# Patient Record
Sex: FEMALE | Race: WHITE | NOT HISPANIC OR LATINO | Employment: FULL TIME | URBAN - METROPOLITAN AREA
[De-identification: names, ages, dates, MRNs, and addresses within clinical notes are randomized per-mention and may not be internally consistent; named-entity substitution may affect disease eponyms.]

---

## 2017-02-27 ENCOUNTER — RECORDS - HEALTHEAST (OUTPATIENT)
Dept: LAB | Facility: CLINIC | Age: 52
End: 2017-02-27

## 2017-02-27 LAB
CHOLEST SERPL-MCNC: 149 MG/DL
FASTING STATUS PATIENT QL REPORTED: NORMAL
HDLC SERPL-MCNC: 52 MG/DL
LDLC SERPL CALC-MCNC: 86 MG/DL
TRIGL SERPL-MCNC: 57 MG/DL

## 2018-03-09 ENCOUNTER — RECORDS - HEALTHEAST (OUTPATIENT)
Dept: LAB | Facility: CLINIC | Age: 53
End: 2018-03-09

## 2018-03-09 LAB
ALBUMIN SERPL-MCNC: 3.8 G/DL (ref 3.5–5)
ALP SERPL-CCNC: 95 U/L (ref 45–120)
ALT SERPL W P-5'-P-CCNC: 73 U/L (ref 0–45)
ANION GAP SERPL CALCULATED.3IONS-SCNC: 7 MMOL/L (ref 5–18)
AST SERPL W P-5'-P-CCNC: 37 U/L (ref 0–40)
BILIRUB SERPL-MCNC: 0.2 MG/DL (ref 0–1)
BUN SERPL-MCNC: 14 MG/DL (ref 8–22)
CALCIUM SERPL-MCNC: 9.4 MG/DL (ref 8.5–10.5)
CHLORIDE BLD-SCNC: 107 MMOL/L (ref 98–107)
CHOLEST SERPL-MCNC: 176 MG/DL
CO2 SERPL-SCNC: 24 MMOL/L (ref 22–31)
CREAT SERPL-MCNC: 0.72 MG/DL (ref 0.6–1.1)
FASTING STATUS PATIENT QL REPORTED: NORMAL
GFR SERPL CREATININE-BSD FRML MDRD: >60 ML/MIN/1.73M2
GLUCOSE BLD-MCNC: 81 MG/DL (ref 70–125)
HDLC SERPL-MCNC: 56 MG/DL
LDLC SERPL CALC-MCNC: 109 MG/DL
POTASSIUM BLD-SCNC: 4 MMOL/L (ref 3.5–5)
PROT SERPL-MCNC: 6.4 G/DL (ref 6–8)
SODIUM SERPL-SCNC: 138 MMOL/L (ref 136–145)
TRIGL SERPL-MCNC: 53 MG/DL

## 2018-12-21 ENCOUNTER — RECORDS - HEALTHEAST (OUTPATIENT)
Dept: ADMINISTRATIVE | Facility: OTHER | Age: 53
End: 2018-12-21

## 2018-12-31 ENCOUNTER — HOSPITAL ENCOUNTER (OUTPATIENT)
Dept: ULTRASOUND IMAGING | Facility: CLINIC | Age: 53
Discharge: HOME | End: 2018-12-31
Attending: PSYCHIATRY & NEUROLOGY

## 2018-12-31 ENCOUNTER — HOSPITAL ENCOUNTER (OUTPATIENT)
Dept: MRI IMAGING | Facility: CLINIC | Age: 53
Discharge: HOME | End: 2018-12-31
Attending: PSYCHIATRY & NEUROLOGY

## 2018-12-31 ENCOUNTER — COMMUNICATION - HEALTHEAST (OUTPATIENT)
Dept: TELEHEALTH | Facility: CLINIC | Age: 53
End: 2018-12-31

## 2018-12-31 DIAGNOSIS — G45.4 TRANSIENT GLOBAL AMNESIA: ICD-10-CM

## 2019-02-21 ENCOUNTER — RECORDS - HEALTHEAST (OUTPATIENT)
Dept: ADMINISTRATIVE | Facility: OTHER | Age: 54
End: 2019-02-21

## 2019-08-06 ENCOUNTER — RECORDS - HEALTHEAST (OUTPATIENT)
Dept: LAB | Facility: CLINIC | Age: 54
End: 2019-08-06

## 2019-08-07 LAB
ALBUMIN SERPL-MCNC: 4.3 G/DL (ref 3.5–5)
ALP SERPL-CCNC: 103 U/L (ref 45–120)
ALT SERPL W P-5'-P-CCNC: 14 U/L (ref 0–45)
ANION GAP SERPL CALCULATED.3IONS-SCNC: 8 MMOL/L (ref 5–18)
AST SERPL W P-5'-P-CCNC: 13 U/L (ref 0–40)
BILIRUB SERPL-MCNC: 0.2 MG/DL (ref 0–1)
BUN SERPL-MCNC: 15 MG/DL (ref 8–22)
CALCIUM SERPL-MCNC: 9.8 MG/DL (ref 8.5–10.5)
CHLORIDE BLD-SCNC: 109 MMOL/L (ref 98–107)
CO2 SERPL-SCNC: 23 MMOL/L (ref 22–31)
CREAT SERPL-MCNC: 0.76 MG/DL (ref 0.6–1.1)
GFR SERPL CREATININE-BSD FRML MDRD: >60 ML/MIN/1.73M2
GLUCOSE BLD-MCNC: 86 MG/DL (ref 70–125)
LIPASE SERPL-CCNC: 72 U/L (ref 0–52)
POTASSIUM BLD-SCNC: 4 MMOL/L (ref 3.5–5)
PROT SERPL-MCNC: 6.8 G/DL (ref 6–8)
SODIUM SERPL-SCNC: 140 MMOL/L (ref 136–145)

## 2019-08-29 ENCOUNTER — RECORDS - HEALTHEAST (OUTPATIENT)
Dept: LAB | Facility: CLINIC | Age: 54
End: 2019-08-29

## 2019-08-29 LAB — LIPASE SERPL-CCNC: 10 U/L (ref 0–52)

## 2020-02-27 ENCOUNTER — RECORDS - HEALTHEAST (OUTPATIENT)
Dept: LAB | Facility: CLINIC | Age: 55
End: 2020-02-27

## 2020-02-27 LAB
ALBUMIN SERPL-MCNC: 4 G/DL (ref 3.5–5)
ALP SERPL-CCNC: 110 U/L (ref 45–120)
ALT SERPL W P-5'-P-CCNC: 16 U/L (ref 0–45)
ANION GAP SERPL CALCULATED.3IONS-SCNC: 11 MMOL/L (ref 5–18)
AST SERPL W P-5'-P-CCNC: 16 U/L (ref 0–40)
BILIRUB SERPL-MCNC: 0.4 MG/DL (ref 0–1)
BUN SERPL-MCNC: 13 MG/DL (ref 8–22)
CALCIUM SERPL-MCNC: 9.6 MG/DL (ref 8.5–10.5)
CHLORIDE BLD-SCNC: 105 MMOL/L (ref 98–107)
CHOLEST SERPL-MCNC: 179 MG/DL
CO2 SERPL-SCNC: 23 MMOL/L (ref 22–31)
CREAT SERPL-MCNC: 0.81 MG/DL (ref 0.6–1.1)
FASTING STATUS PATIENT QL REPORTED: NO
GFR SERPL CREATININE-BSD FRML MDRD: >60 ML/MIN/1.73M2
GLUCOSE BLD-MCNC: 71 MG/DL (ref 70–125)
HDLC SERPL-MCNC: 49 MG/DL
LDLC SERPL CALC-MCNC: 115 MG/DL
POTASSIUM BLD-SCNC: 4.2 MMOL/L (ref 3.5–5)
PROT SERPL-MCNC: 6.6 G/DL (ref 6–8)
SODIUM SERPL-SCNC: 139 MMOL/L (ref 136–145)
TRIGL SERPL-MCNC: 76 MG/DL
TSH SERPL DL<=0.005 MIU/L-ACNC: 1.32 UIU/ML (ref 0.3–5)
VIT B12 SERPL-MCNC: 338 PG/ML (ref 213–816)

## 2020-06-22 ENCOUNTER — COMMUNICATION - HEALTHEAST (OUTPATIENT)
Dept: SCHEDULING | Facility: CLINIC | Age: 55
End: 2020-06-22

## 2020-06-22 ENCOUNTER — AMBULATORY - HEALTHEAST (OUTPATIENT)
Dept: UROLOGY | Facility: CLINIC | Age: 55
End: 2020-06-22

## 2020-06-22 ENCOUNTER — AMBULATORY - HEALTHEAST (OUTPATIENT)
Dept: FAMILY MEDICINE | Facility: CLINIC | Age: 55
End: 2020-06-22

## 2020-06-22 ENCOUNTER — COMMUNICATION - HEALTHEAST (OUTPATIENT)
Dept: UROLOGY | Facility: CLINIC | Age: 55
End: 2020-06-22

## 2020-06-22 ENCOUNTER — OFFICE VISIT - HEALTHEAST (OUTPATIENT)
Dept: UROLOGY | Facility: CLINIC | Age: 55
End: 2020-06-22

## 2020-06-22 DIAGNOSIS — Z11.59 ENCOUNTER FOR SCREENING FOR OTHER VIRAL DISEASES: ICD-10-CM

## 2020-06-22 DIAGNOSIS — N20.0 CALCULUS OF KIDNEY: ICD-10-CM

## 2020-06-22 DIAGNOSIS — N20.1 CALCULUS OF URETER: ICD-10-CM

## 2020-06-22 LAB
SARS-COV-2 PCR COMMENT: NORMAL
SARS-COV-2 RNA SPEC QL NAA+PROBE: NEGATIVE
SARS-COV-2 VIRUS SPECIMEN SOURCE: NORMAL

## 2020-06-22 RX ORDER — LANOLIN ALCOHOL/MO/W.PET/CERES
3 CREAM (GRAM) TOPICAL
Status: SHIPPED | COMMUNITY
Start: 2020-06-22 | End: 2024-08-01

## 2020-06-23 ENCOUNTER — ANESTHESIA - HEALTHEAST (OUTPATIENT)
Dept: SURGERY | Facility: CLINIC | Age: 55
End: 2020-06-23

## 2020-06-23 ENCOUNTER — SURGERY - HEALTHEAST (OUTPATIENT)
Dept: SURGERY | Facility: CLINIC | Age: 55
End: 2020-06-23

## 2020-06-23 ASSESSMENT — MIFFLIN-ST. JEOR: SCORE: 1458.64

## 2020-07-06 ENCOUNTER — COMMUNICATION - HEALTHEAST (OUTPATIENT)
Dept: UROLOGY | Facility: CLINIC | Age: 55
End: 2020-07-06

## 2020-07-08 ENCOUNTER — RECORDS - HEALTHEAST (OUTPATIENT)
Dept: LAB | Facility: CLINIC | Age: 55
End: 2020-07-08

## 2020-07-08 LAB
C REACTIVE PROTEIN LHE: <0.1 MG/DL (ref 0–0.8)
ERYTHROCYTE [SEDIMENTATION RATE] IN BLOOD BY WESTERGREN METHOD: 8 MM/HR (ref 0–20)
TSH SERPL DL<=0.005 MIU/L-ACNC: 1.54 UIU/ML (ref 0.3–5)

## 2020-07-11 LAB
GLIADIN IGA SER-ACNC: 0.3 U/ML
GLIADIN IGG SER-ACNC: <0.4 U/ML
IGA SERPL-MCNC: 64 MG/DL (ref 65–400)
TTG IGA SER-ACNC: <0.1 U/ML
TTG IGG SER-ACNC: <0.6 U/ML

## 2020-07-16 ENCOUNTER — RECORDS - HEALTHEAST (OUTPATIENT)
Dept: LAB | Facility: CLINIC | Age: 55
End: 2020-07-16

## 2020-07-16 LAB
C DIFF TOX B STL QL: NEGATIVE
RIBOTYPE 027/NAP1/BI: NORMAL

## 2020-07-17 LAB
O+P STL MICRO: NORMAL
SHIGA TOXIN 1: NEGATIVE
SHIGA TOXIN 2: NEGATIVE

## 2020-07-19 LAB — BACTERIA SPEC CULT: NORMAL

## 2020-07-20 ENCOUNTER — OFFICE VISIT - HEALTHEAST (OUTPATIENT)
Dept: UROLOGY | Facility: CLINIC | Age: 55
End: 2020-07-20

## 2020-07-20 DIAGNOSIS — R35.0 URINE FREQUENCY: ICD-10-CM

## 2020-07-20 DIAGNOSIS — N20.2 CALCULUS OF KIDNEY WITH CALCULUS OF URETER: ICD-10-CM

## 2020-07-20 RX ORDER — TOLTERODINE TARTRATE 2 MG/1
2 TABLET, EXTENDED RELEASE ORAL 2 TIMES DAILY
Qty: 28 TABLET | Refills: 0 | Status: SHIPPED | OUTPATIENT
Start: 2020-07-20 | End: 2024-08-01

## 2020-08-17 ENCOUNTER — OFFICE VISIT - HEALTHEAST (OUTPATIENT)
Dept: UROLOGY | Facility: CLINIC | Age: 55
End: 2020-08-17

## 2020-08-17 DIAGNOSIS — R35.0 URINARY FREQUENCY: ICD-10-CM

## 2021-02-25 ENCOUNTER — TRANSFERRED RECORDS (OUTPATIENT)
Dept: MULTI SPECIALTY CLINIC | Facility: CLINIC | Age: 56
End: 2021-02-25

## 2021-02-25 LAB
HPV ABSTRACT: NORMAL
PAP-ABSTRACT: NORMAL

## 2021-04-14 ENCOUNTER — RECORDS - HEALTHEAST (OUTPATIENT)
Dept: LAB | Facility: CLINIC | Age: 56
End: 2021-04-14

## 2021-04-14 LAB
ALBUMIN SERPL-MCNC: 4.1 G/DL (ref 3.5–5)
ALP SERPL-CCNC: 102 U/L (ref 45–120)
ALT SERPL W P-5'-P-CCNC: 10 U/L (ref 0–45)
ANION GAP SERPL CALCULATED.3IONS-SCNC: 8 MMOL/L (ref 5–18)
AST SERPL W P-5'-P-CCNC: 10 U/L (ref 0–40)
BILIRUB SERPL-MCNC: 0.4 MG/DL (ref 0–1)
BUN SERPL-MCNC: 12 MG/DL (ref 8–22)
CALCIUM SERPL-MCNC: 10.1 MG/DL (ref 8.5–10.5)
CHLORIDE BLD-SCNC: 104 MMOL/L (ref 98–107)
CO2 SERPL-SCNC: 27 MMOL/L (ref 22–31)
CREAT SERPL-MCNC: 0.77 MG/DL (ref 0.6–1.1)
GFR SERPL CREATININE-BSD FRML MDRD: >60 ML/MIN/1.73M2
GLUCOSE BLD-MCNC: 80 MG/DL (ref 70–125)
POTASSIUM BLD-SCNC: 4.9 MMOL/L (ref 3.5–5)
PROT SERPL-MCNC: 6.7 G/DL (ref 6–8)
SODIUM SERPL-SCNC: 139 MMOL/L (ref 136–145)
TSH SERPL DL<=0.005 MIU/L-ACNC: 2.59 UIU/ML (ref 0.3–5)

## 2021-05-24 ENCOUNTER — RECORDS - HEALTHEAST (OUTPATIENT)
Dept: ADMINISTRATIVE | Facility: CLINIC | Age: 56
End: 2021-05-24

## 2021-05-28 ENCOUNTER — RECORDS - HEALTHEAST (OUTPATIENT)
Dept: ADMINISTRATIVE | Facility: CLINIC | Age: 56
End: 2021-05-28

## 2021-06-01 ENCOUNTER — RECORDS - HEALTHEAST (OUTPATIENT)
Dept: ADMINISTRATIVE | Facility: CLINIC | Age: 56
End: 2021-06-01

## 2021-06-03 ENCOUNTER — RECORDS - HEALTHEAST (OUTPATIENT)
Dept: ADMINISTRATIVE | Facility: CLINIC | Age: 56
End: 2021-06-03

## 2021-06-04 VITALS — BODY MASS INDEX: 25.77 KG/M2 | HEIGHT: 69 IN | WEIGHT: 174 LBS

## 2021-06-09 NOTE — TELEPHONE ENCOUNTER
Caller is complaining of flank pain and rates pain 8/10. Caller states she was diagnosed with kidney stones on 06/20/20. Caller states the pain medication is not working and that she needs something stronger. Caller states she is taking oxycodone 5mg, and tylenol and ibuprofen. Triager advised caller to take oxycodone 5mg with the 1,00mg of acetaminophen together, and patient was taking them hours apart from one another. Caller states she will try that and call back if no improvement.     Reason for Disposition    MODERATE pain (e.g., interferes with normal activities or awakens from sleep)    Additional Information    Negative: Passed out (i.e., lost consciousness, collapsed and was not responding)    Negative: Shock suspected (e.g., cold/pale/clammy skin, too weak to stand, low BP, rapid pulse)    Negative: Difficult to awaken or acting confused (e.g., disoriented, slurred speech)    Negative: Sounds like a life-threatening emergency to the triager    Negative: Followed a major injury to the back (e.g., MVA, fall > 10 feet or 3 meters, penetrating injury, etc.)    Negative: Back pain or flank pain during pregnancy    Negative: Upper, mid or lower back pain that occurs mainly in the midline    Negative: [1] SEVERE pain (e.g., excruciating, scale 8-10) AND [2] present > 1 hour    Negative: [1] SEVERE pain (e.g., excruciating, scale 8-10) AND [2] not improved after pain medicine    Negative: [1] Sudden onset of severe flank pain AND [2] age > 60    Negative: [1] Abdominal pain AND [2] age > 60    Negative: [1] Unable to urinate (or only a few drops) > 4 hours AND     [2] bladder feels very full (e.g., palpable bladder or strong urge to urinate)    Negative: Vomiting    Negative: Weakness of a leg or foot (e.g., unable to bear weight, dragging foot)    Negative: Patient sounds very sick or weak to the triager    Negative: Fever > 100.5 F (38.1 C)    Negative: Pain or burning with passing urine (urination)    Protocols  used: FLANK PAIN-A-AH

## 2021-06-09 NOTE — TELEPHONE ENCOUNTER
Message left for patient to call clinic to set up an appointment for kidney stone follow-up for today or tomorrow.  Sandy Arias RN

## 2021-06-09 NOTE — TELEPHONE ENCOUNTER
Message left for patient to call [facility] to schedule her month post op visit and to follow up on how she is doing.  Sandy Arias RN

## 2021-06-09 NOTE — PROGRESS NOTES
"Assessment/Plan:        Diagnoses and all orders for this visit:    Calculus of ureter  -     Patient Stated Goal: Know what to expect after surgery  -     Ureteroscopy Education    Calculus of kidney  -     Ureteroscopy Education    Other orders  -     melatonin 3 mg Tab tablet; Take 3 mg by mouth at bedtime as needed.      Stone Management Plan  [facility] 6/22/2020   Urinary Tract Infection No suspicion of infection   Renal Colic Inadequate outpatient management of symptoms   Renal Failure No suspicion of renal failure   Current CT date 6/21/2020   Right sided stones? Yes   R Number of ureteral stones 1   R GSD of ureteral stones 6   R Location of ureteral stone Mid   R Number of kidney stones  1   R GSD of kidney stones 2 - 4   R Hydronephrosis Mild   R Stone Event New event   Diagnosis date 6/21/2020   Initial location of primary symptomatic stone Mid   Initial GSD of primary symptomatic stone 6   R Current Plan Clear   Clear rationale Symptomatic   L Stone Event No current event             Phone call duration: 24 minutes    Washington Moses MD    Subjective:        The patient has been notified of following:     \"This telephone visit will be conducted via a call between you and your physician/provider. We have found that certain health care needs can be provided without the need for a physical exam.  This service lets us provide the care you need with a short phone conversation. If a prescription is necessary, we can send it directly to your pharmacy.  If labs and/or imaging are needed, we can place orders so that you can have the test (s) done at a later time.    If during the course of the call the physician/provider feels a telephone visit is not appropriate, you will not be charged for this service.\"     HPI  Ms. Kenia Dumont is a 54 y.o.  female who is being evaluated via a billable telephone visit by [facility] ED follow up    She is a remotely " recurrent unidentified composition stone former who has not required stone clearance procedures. She has not previously participated in stone risk evaluation. She has no identified modifiable stone risk factors. She has no identified non-modifiable stone risk factors.    Severe sudden onset right flank pain yesterday brought her to the ED. Pain has been under poor control since that time. Chill with pain. No fever. May have had similar episodic right sided pain for a year and a half. Had some recent diarrhea with possible low risk COVID exposure - swab negative ~ 2 weeks ago. Unable to work with current poor pain control despite appropriate analgesia protocol.    CT scan is personally reviewed and demonstrates a right sided 6 mm mid ureteral stone and a 4-5 mm stone in the right lower pole.    Significant labs from presentation include no growth on urine culture.    PLAN    Will proceed with ureterscopic stone clearance tomorrow. Risks and benefits were detailed of ureteroscopic stone clearance including potential issues of urinary or systemic infection, ureteral injury, inaccessible stone, incomplete stone clearance, multiple surgeries, and stent related symptoms of urgency, frequency and hematuria Patient verbalized understanding. Patient agrees with plan as discussed. Preoperative evaluation with primary care has not been requested because of urgency of situation.         ROS   A 12 point comprehensive review of systems is negative except for HPI    No past medical history on file.    No past surgical history on file.    Current Outpatient Medications   Medication Sig Dispense Refill     acetaminophen (TYLENOL) 500 MG tablet Take 2 tablets (1,000 mg total) by mouth 4 (four) times a day for 7 days. 56 tablet 0     atorvastatin (LIPITOR) 20 MG tablet Take 20 mg by mouth at bedtime.       busPIRone (BUSPAR) 15 MG tablet Take 15 mg by mouth at bedtime.       dimenhyDRINATE (DRAMAMINE) 50 MG tablet Take 1 tablet (50  mg total) by mouth at bedtime for 7 days. 7 tablet 0     dimenhyDRINATE (DRAMAMINE) 50 MG tablet Take 1 tablet (50 mg total) by mouth 4 (four) times a day as needed. 28 tablet 0     ibuprofen (ADVIL,MOTRIN) 200 MG tablet Take 2 tablets (400 mg total) by mouth 4 (four) times a day for 7 days. 56 tablet 0     Lactobacillus acidoph-pectin (ACIDOPHILUS-PECTIN) 75 million cell -100 mg cap capsule Take 1 capsule by mouth at bedtime.       melatonin 3 mg Tab tablet Take 3 mg by mouth at bedtime as needed.       omeprazole (PRILOSEC) 20 MG capsule Take 20 mg by mouth at bedtime.       oxyCODONE (ROXICODONE) 5 MG immediate release tablet Please use 1 tab every 6 hours as needed for breakthrough pain. 6 tablet 0     traZODone (DESYREL) 100 MG tablet Take 100 mg by mouth at bedtime.       ondansetron (ZOFRAN ODT) 4 MG disintegrating tablet Take 1 tablet (4 mg total) by mouth every 8 (eight) hours as needed for nausea. 12 tablet 0     No current facility-administered medications for this visit.        Allergies   Allergen Reactions     Tetracyclines Unknown     Zyban [Bupropion Hcl] Unknown       Social History     Socioeconomic History     Marital status:      Spouse name: Not on file     Number of children: Not on file     Years of education: Not on file     Highest education level: Not on file   Occupational History     Not on file   Social Needs     Financial resource strain: Not on file     Food insecurity     Worry: Not on file     Inability: Not on file     Transportation needs     Medical: Not on file     Non-medical: Not on file   Tobacco Use     Smoking status: Current Every Day Smoker     Types: Cigarettes     Tobacco comment: 4-5 cigarettes per day   Substance and Sexual Activity     Alcohol use: Not on file     Drug use: No     Sexual activity: Not on file   Lifestyle     Physical activity     Days per week: Not on file     Minutes per session: Not on file     Stress: Not on file   Relationships     Social  connections     Talks on phone: Not on file     Gets together: Not on file     Attends Yazdanism service: Not on file     Active member of club or organization: Not on file     Attends meetings of clubs or organizations: Not on file     Relationship status: Not on file     Intimate partner violence     Fear of current or ex partner: Not on file     Emotionally abused: Not on file     Physically abused: Not on file     Forced sexual activity: Not on file   Other Topics Concern     Not on file   Social History Narrative     Not on file       No family history on file.    Objective:      Physical Exam  There were no vitals filed for this visit.    Labs    Urinalysis POC (Office):  Nitrite, UA   Date Value Ref Range Status   06/21/2020 Negative Negative Final   10/11/2018 Negative Negative Final       Lab Urinalysis:  Blood, UA   Date Value Ref Range Status   06/21/2020 Moderate (!) Negative Final   10/11/2018 Negative Negative Final     Nitrite, UA   Date Value Ref Range Status   06/21/2020 Negative Negative Final   10/11/2018 Negative Negative Final     Leukocytes, UA   Date Value Ref Range Status   06/21/2020 Small (!) Negative Final   10/11/2018 Negative Negative Final     pH, UA   Date Value Ref Range Status   06/21/2020 5.5 4.5 - 8.0 Final   10/11/2018 5.0 4.5 - 8.0 Final    and Acute Labs   CBC   WBC   Date Value Ref Range Status   06/21/2020 11.0 4.0 - 11.0 thou/uL Final   10/11/2018 7.7 4.0 - 11.0 thou/uL Final   04/29/2016 7.6 4.0 - 11.0 thou/uL Final     Hemoglobin   Date Value Ref Range Status   06/21/2020 14.1 12.0 - 16.0 g/dL Final   10/11/2018 14.2 12.0 - 16.0 g/dL Final   04/29/2016 13.3 12.0 - 16.0 g/dL Final     Platelets   Date Value Ref Range Status   06/21/2020 349 140 - 440 thou/uL Final   10/11/2018 283 140 - 440 thou/uL Final   04/29/2016 300 140 - 440 thou/uL Final   , C Reactive Protein    CRP   Date Value Ref Range Status   06/21/2020 0.3 0.0 - 0.8 mg/dL Final   , Renal Panel  [facility]  Creatinine    Date Value Ref Range Status   06/21/2020 1.04 0.60 - 1.10 mg/dL Final   02/27/2020 0.81 0.60 - 1.10 mg/dL Final   08/06/2019 0.76 0.60 - 1.10 mg/dL Final     Potassium   Date Value Ref Range Status   06/21/2020 3.5 3.5 - 5.0 mmol/L Final   02/27/2020 4.2 3.5 - 5.0 mmol/L Final   08/06/2019 4.0 3.5 - 5.0 mmol/L Final     Calcium   Date Value Ref Range Status   06/21/2020 10.2 8.5 - 10.5 mg/dL Final   02/27/2020 9.6 8.5 - 10.5 mg/dL Final   08/06/2019 9.8 8.5 - 10.5 mg/dL Final    and Urine Culture    Culture   Date Value Ref Range Status   06/21/2020 No Growth  Final

## 2021-06-09 NOTE — ANESTHESIA PREPROCEDURE EVALUATION
Anesthesia Evaluation      Patient summary reviewed   No history of anesthetic complications     Airway   Mallampati: II  Neck ROM: full   Pulmonary - normal exam   (+) a smoker                         Cardiovascular - normal exam   Neuro/Psych      Endo/Other       GI/Hepatic/Renal    (+)   chronic renal disease,           Dental - normal exam                        Anesthesia Plan  Planned anesthetic: general LMA    ASA 2   Induction: intravenous   Anesthetic plan and risks discussed with: patient  Anesthesia plan special considerations: antiemetics,   Post-op plan: routine recovery

## 2021-06-09 NOTE — PATIENT INSTRUCTIONS - HE
Patient Stated Goal: Know what to expect after surgery  Ureteroscopy    Ureteroscopy is a procedure which is done for clearance of stones from the ureter, kidney or both. There are no incisions involved. The procedure involves your surgeon placing a small scope into your urethra. This is the opening where urine leaves your body.  The surgeon watches as they carefully guide the scope to the stone(s).  Modern flexible ureteroscopes can be used to reach virtually any location within the urinary tract.     The size, shape and location of the stone determines how best to treat the stone(s).  Whenever possible, stones are removed in one piece.  Larger stones need to be broken using a laser before removing in smaller pieces.  The goal is to remove all stones and stone fragments from that side of the body in a single treatment.  Complete stone clearance is an important step to prevent future kidney stone episodes.    Surgery:    Same day outpatient procedure    30-60 minutes    Procedure done in hospital surgical suite    General anesthesia (you will be asleep during the procedure)     Antibiotic prior to surgery to prevent infection    Physician will visit with you and respond to any questions or concerns and consent will be signed prior to going to the operating room    Risks:    Infection - Preoperative antibiotics should prevent new infections but it is possible that unanticipated bacteria may be introduced at time of surgery or that the stones were actually chronically infected before surgery      Injury - The ureter may be injured during this procedure.  This is most likely to happen if the ureter was very inflamed before surgery or if a stone is very tightly impacted.  The surgeon will not aggressively treat a stone if this creates a risk of injury.        Inaccessible Stones -A single procedure is effective in 95% of cases, but if your ureter is very narrow or your kidney stone is very impacted, a stent will be  placed and the procedure stopped.  In 1-2 weeks after the ureter has relaxed, the patient will be brought back to surgery and the procedure can be safely performed.      Incomplete stone clearance -Occasionally stone or stone fragments may not be completely cleared.  These may pass on their own, which may cause discomfort.  Our goal is to remove all possible stones and fragments.    Stent:      An internal soft tube will be placed between the kidney and the bladder while in surgery (after the stone is cleared). The stent will keep the kidney draining.    What should I expect?     It is common for a stent to cause some irritation and discomfort.   You may have:      The need to urinate suddenly     The need to urinate often     Pain during urination     A dull backache, which may get worse during urination     Blood stained urine (like fruit punch) and occasional small clots    It s important to remember the stent is necessary and only temporary. To feel more comfortable:      Drink more than you normally would but you do not have to constantly  flush your kidneys     Limiting your activity may decrease irritation or bleeding    Ibuprofen - 2 tablets every 6-8 hours     Use pain medications as directed.    When is the stent removed?    Most stents are removed within 5 days to 2 weeks after a procedure.     How is the stent removed?     Your stent will be removed in the [facility] with a small telescope and a grasping tool.  It usually takes less than 1 minute to remove the stent.    What should I expect after the stent is removed?     You should feel normal by the next day    Some patients find:    An increase in back pain about an hour after the stent is removed as the kidney fills up with urine before it starts to empty.  It can be as uncomfortable as your initial stone episode.  Taking pain medications before stent removal may be helpful, but you would need someone else to drive you to and from your  appointment.    Bladder symptoms usually disappear by the next morning.    Small amounts of blood in the urine may be seen occasionally for up to a week.    Diet:      After surgery, there are no dietary restrictions - Drink to thirst, there is no need to increase intake of fluids, as this may increase nausea symptoms. Try to eat smaller, more frequent snacks, instead of large meals.    Activity:    Many people return to work within 1-2 days. Fatigue is normal for a couple of weeks following surgery. With increased activity you may experience more discomfort and you may notice more blood in your urine.      Post-Operative Symptom Control    While you recover from your procedure, you can take steps to ease your recovery.    Medications that prevent further episodes of severe pain and help stones pass: Take these as prescribed on a regular basis even if you are NOT in pain      Ibuprofen (Advil or Motrin) - Is available over the counter Take 2 (200mg) tablets every 6 hours until the stone passes.  o prevents spasm of the ureter.    o Decreases pain      Dramamine - (drowsy version, non-generic formulation) Is available over the counter and decreases spasm of the ureter.  Take 50mg at bedtime every night until the stone passes. In addition, take every 6 hours as needed.  Dramamine:  o Decreases nausea  o Decreases acute pain  o Decreases recurrence of pain for next 24 hours  o Will help you sleep        *This medication will cause increased drowsiness, do not drive or operate machinery for 6 hours      Flomax- Studies show that Flomax decreases irritation from stents.   o Take every day with food until stone passes even if you do not have pain  o Flomax does not relieve pain.        *This medication may cause nasal congestion or light-headedness      Detrol ( Tolterodine) - After surgery Detrol may decrease stent irritation and pelvic pain  o Take as prescribed     *This medication may cause dry mouth, constipation or  blurry vision. Stop medication if unable to urinate.    Medication that are taken as needed to manage break through symptoms: Take these ONLY as required and hopefully not at all      Narcotics (Percocet, Vicodin, Dilaudid)- take as prescribed for severe pain unrelieved by ibuprofen and dramamine  o Take as prescribed for severe pain  o Narcotics have significant side effects and only  cover-up  pain. They have no effect on cause of pain.  o Common side effects:  - Confusion, disorientation and sedation - DO NOT DRIVE OR OPERATE MACHINERY WITHIN 24 HOURS  - Nausea - take Dramamine or Zofran  or Haldol to help control  - Constipation  - Sleep disturbances      Ondansetron (Zofran)-  o Take as prescribed  o Reserve for severe nausea  o May cause constipation, start over the counter Miralax if needed to treat this    Haldol-  o Take as prescribed  o Reserve for severe nausea    Warning Signs/Symptoms - Please call the [facility] 24 hours a day at [phone] IMMEDIATELY if you experience any of these:    Fever greater than 100.1     Chills    Pain NOT CONTROLLED by pain medications    Heavy bleeding or large clots in urine (small clots can be normal)    Persistent nausea and/or vomiting    Post-Operative Follow up:    The stone(s) will be sent from surgery to a lab for composition analysis.  These results are usually available before a one month post-operative visit.  If you had laser treatment to break up your stone, you will usually be scheduled for a low dose CT scan prior to your one month appointment.  This scan allows your surgeon to confirm that all stone fragments were cleared at time of surgery and that there have been no complications.  These results along with possible labs and urine studies will help us develop an individualized plan to prevent new stones from forming and keep existing stones from enlarging.  This visit is usually scheduled about 1 month after your original surgery.    The  [facility] can respond to your questions or concerns 24 hours a day at [phone].

## 2021-06-09 NOTE — ANESTHESIA CARE TRANSFER NOTE
Last vitals:   Vitals:    06/23/20 1248   BP: 131/81   Pulse: 80   Resp: 16   Temp: 36.5  C (97.7  F)   SpO2: 99%     Patient's level of consciousness is drowsy  Spontaneous respirations: yes  Maintains airway independently: yes  Dentition unchanged: yes  Oropharynx: oropharynx clear of all foreign objects    QCDR Measures:  ASA# 20 - Surgical Safety Checklist: WHO surgical safety checklist completed prior to induction    PQRS# 430 - Adult PONV Prevention: 4558F - Pt received => 2 anti-emetic agents (different classes) preop & intraop  ASA# 8 - Peds PONV Prevention: NA - Not pediatric patient, not GA or 2 or more risk factors NOT present  PQRS# 424 - Niyah-op Temp Management: NA - MAC anesthesia or case < 60 minutes  PQRS# 426 - PACU Transfer Protocol: - Transfer of care checklist used  ASA# 14 - Acute Post-op Pain: ASA14B - Patient did NOT experience pain >= 7 out of 10

## 2021-06-09 NOTE — PROGRESS NOTES
"Assessment/Plan:        Diagnoses and all orders for this visit:    Urine frequency  -     tolterodine (DETROL) 2 MG tablet; Take 1 tablet (2 mg total) by mouth 2 (two) times a day for 14 days.  Dispense: 28 tablet; Refill: 0    Calculus of kidney with calculus of ureter  -     Patient Stated Goal: Prevent further stones  -     Calcium Oxalate Stone Prevention Education      Stone Management Plan  [facility] 6/22/2020 7/20/2020   Urinary Tract Infection No suspicion of infection No suspicion of infection   Renal Colic Inadequate outpatient management of symptoms Asymptomatic at this time   Renal Failure No suspicion of renal failure No suspicion of renal failure   Current CT date 6/21/2020 7/2/2020   Right sided stones? Yes No   R Number of ureteral stones 1 -   R GSD of ureteral stones 6 -   R Location of ureteral stone Mid -   R Number of kidney stones  1 -   R GSD of kidney stones 2 - 4 -   R Hydronephrosis Mild Mild   R Stone Event New event Resolved event   Diagnosis date 6/21/2020 -   Initial location of primary symptomatic stone Mid -   Initial GSD of primary symptomatic stone 6 -   Resolved date - 7/20/2020   R Post-op status - No residual stone   R Current Plan Clear -   Clear rationale Symptomatic -   L Stone Event No current event No current event             Phone call duration: 17 minutes    Washington Moses MD     Subjective:      The patient has been notified of following:     \"This telephone visit will be conducted via a call between you and your physician/provider. We have found that certain health care needs can be provided without the need for a physical exam.  This service lets us provide the care you need with a short phone conversation.  If a prescription is necessary we can send it directly to your pharmacy.  If labs and/or imaging are needed, we can place orders so you can have the test (s) done at a later time.    If during the course of the call the physician/provider feels a " "telephone visit is not appropriate, you will not be charged for this service.\"     HPI  Ms. Kenia Dumont is a 54 y.o.  female who is being evaluated via a billable telephone visit by [facility] for late postoperative follow-up.     She returns status post Right ureteroscopic laser lithotripsy for renal and ureteral stone. She has had to visit the Emergency Department for post stent removal colic.     She is asymptomatic at present. Pertinent negative current symptoms include:  fever, chills and right flank pain. She continues to be bothered by urinary frequency with nocturia x 3-4    New CT scan was personally reviewed and demonstrates complete clearance of targeted stone  with stable mild hydronephrosis. Study was performed immediately after stent removal.    Stone composition was 100% calcium oxalate.     She is a low risk remotely recurrent stone former and was educated on conservative strategies for risk reduction    Will try her on two weeks of Detrol to see if the nocturia will improve. Recheck in 1 month.       ROS   Review of systems is negative except for HPI.    Past Medical History:   Diagnosis Date     Anxiety      Hyperlipidemia      Kidney stone        Past Surgical History:   Procedure Laterality Date     CYSTOSCOPY W/ URETERAL STENT PLACEMENT Right 6/23/2020    Procedure: CYSTOSCOPY, WITH FLEXIBLE URETEROSCOPIC CALCULUS REMOVAL LASER LITHOTRIPSY  AND STENT INSERTION RIGHT;  Surgeon: Washington Moses MD;  Location: [facility];  Service: Urology     OVARY SURGERY         Current Outpatient Medications   Medication Sig Dispense Refill     atorvastatin (LIPITOR) 20 MG tablet Take 20 mg by mouth at bedtime.       busPIRone (BUSPAR) 15 MG tablet Take 15 mg by mouth at bedtime.       Lactobacillus acidoph-pectin (ACIDOPHILUS-PECTIN) 75 million cell -100 mg cap capsule Take 1 capsule by mouth at bedtime.       melatonin 3 mg Tab tablet Take 3 mg by mouth " at bedtime as needed.       omeprazole (PRILOSEC) 20 MG capsule Take 20 mg by mouth at bedtime.       traZODone (DESYREL) 100 MG tablet Take 100 mg by mouth at bedtime.       tolterodine (DETROL) 2 MG tablet Take 1 tablet (2 mg total) by mouth 2 (two) times a day for 14 days. 28 tablet 0     No current facility-administered medications for this visit.        Allergies   Allergen Reactions     Tetracyclines Unknown     Zyban [Bupropion Hcl] Unknown       Social History     Socioeconomic History     Marital status:      Spouse name: Not on file     Number of children: Not on file     Years of education: Not on file     Highest education level: Not on file   Occupational History     Not on file   Social Needs     Financial resource strain: Not on file     Food insecurity     Worry: Not on file     Inability: Not on file     Transportation needs     Medical: Not on file     Non-medical: Not on file   Tobacco Use     Smoking status: Current Every Day Smoker     Types: Cigarettes     Smokeless tobacco: Current User     Tobacco comment: 4-5 cigarettes per day   Substance and Sexual Activity     Alcohol use: Not Currently     Drug use: No     Sexual activity: Not Currently   Lifestyle     Physical activity     Days per week: Not on file     Minutes per session: Not on file     Stress: Not on file   Relationships     Social connections     Talks on phone: Not on file     Gets together: Not on file     Attends Catholic service: Not on file     Active member of club or organization: Not on file     Attends meetings of clubs or organizations: Not on file     Relationship status: Not on file     Intimate partner violence     Fear of current or ex partner: Not on file     Emotionally abused: Not on file     Physically abused: Not on file     Forced sexual activity: Not on file   Other Topics Concern     Not on file   Social History Narrative     Not on file       No family history on file.    Objective:      Labs    Urinalysis POC (Office):  Nitrite, UA   Date Value Ref Range Status   07/02/2020 Negative Negative Final   06/21/2020 Negative Negative Final   10/11/2018 Negative Negative Final       Lab Urinalysis:  Blood, UA   Date Value Ref Range Status   07/02/2020 Small (!) Negative Final   06/21/2020 Moderate (!) Negative Final   10/11/2018 Negative Negative Final     Nitrite, UA   Date Value Ref Range Status   07/02/2020 Negative Negative Final   06/21/2020 Negative Negative Final   10/11/2018 Negative Negative Final     Leukocytes, UA   Date Value Ref Range Status   07/02/2020 Large (!) Negative Final   06/21/2020 Small (!) Negative Final   10/11/2018 Negative Negative Final     pH, UA   Date Value Ref Range Status   07/02/2020 5.5 4.5 - 8.0 Final   06/21/2020 5.5 4.5 - 8.0 Final   10/11/2018 5.0 4.5 - 8.0 Final

## 2021-06-09 NOTE — PATIENT INSTRUCTIONS - HE
Patient Stated Goal: Prevent further stones  Calcium Oxalate Stone Prevention Self Management    Drink more fluids:    Drinking more liquids is the best way you can help prevent future stones. Stones can form when substances in the urine are too concentrated. The more you drink, the more urine you will make. This means all substances in the urine will be less concentrated.    How much urine should I be producing?    The usual recommended daily urine production is about 2 to 3 quarts (6264-6071 ml). If you are producing more than 3 quarts of urine on a regular basis, it is possible to deplete important minerals stored in the body.    To measure the amount of urine you produce in a day, you can either:    Collect all urine in a container and measure at the end of the day     Use a measuring cup each time you urinate and add up the amounts at the end of the day     Observe    Color - Dark caridad urine is concentrated. Light straw color or lighter is dilute and desirable     Odor - Concentrated urine tends to smell stronger. Dilute urine is nearly odorless    Ways to increase your fluid intake    Increasing the amount of fluid you drink is effective for all types of kidney stones. While water is commonly recommended, all fluids are effective for increasing the amount of urine your body produces.    Focus on starting a lifelong habit, rather than a short-term solution.     Keep liquids on hand that you like. Crystal Light is a low calorie appropriate choice.    Drink out of larger glasses. You'll tend to drink more with each serving.     Have an additional glass of fluid with each meal.     Keep a water or drink bottle at work and fill it regularly.     *If you are prone to fluid retention, consult your doctor before making changes to your fluid habits.    Low Oxalate Diet:    Avoid excess amounts or daily consumption of these foods:    All nuts and nut products including peanuts, almonds, pecans, peanut butter, almond  milk    Rhubarb    Chocolate    Soybeans and soy products     Spinach    Wheat Germ    Beets    Maintain a normal calcium diet:    Researches have found that people with low calcium intakes tend to have more stones. Foods with high calcium content are acceptable and include:    Dairy products (including milk, cheese and yogurt)    Meat and fish    Enriched cereals    Dark green vegetables    What about calcium supplements?     Many people take calcium supplements, either on their own or as prescribed by a doctor. Research has indicated that calcium supplements do not usually pose a risk for stone formation.  Calcium citrate is a better choice for a supplement.    Avoid excess salt:    Salt (sodium chloride) is found in abundance in many foods. High sodium levels in the urine can interfere with the kidney's handling of calcium.     Tips for reducing the salt in your diet:    Don't use salt at the table    Reduce the salt used in food preparation. Try 1/2 teaspoon when recipes call for 1 teaspoon.    Use herbs and spices for flavoring instead of salt.    Avoid salty foods.    Check the label before you buy or use a product. Note sodium and portion size information.    Try to consume less than 2,000 mg/day. (1 teaspoon = 2,000 mg)    Foods with high sodium content include:    Processed meat (including luncheon meats, sausage)     Crackers     Instant cereal     Processed cheese     Canned soups     Chips and snack foods     Soy sauce    [facility] can respond to your questions or concerns 24 hours a day at [phone].

## 2021-06-09 NOTE — ANESTHESIA POSTPROCEDURE EVALUATION
Patient: Kenia Dumont  Procedure(s):  CYSTOSCOPY, WITH FLEXIBLE URETEROSCOPIC CALCULUS REMOVAL LASER LITHOTRIPSY  AND STENT INSERTION RIGHT (Right)  Anesthesia type: general    Patient location: PACU  Last vitals:   Vitals Value Taken Time   /77 6/23/2020  1:45 PM   Temp 36.3  C (97.3  F) 6/23/2020  1:00 PM   Pulse 67 6/23/2020  1:46 PM   Resp 16 6/23/2020  1:20 PM   SpO2 100 % 6/23/2020  1:46 PM   Vitals shown include unvalidated device data.  Post vital signs: stable  Level of consciousness: awake and responds to simple questions  Post-anesthesia pain: pain controlled  Post-anesthesia nausea and vomiting: no  Pulmonary: unassisted, return to baseline  Cardiovascular: stable and blood pressure at baseline  Hydration: adequate  Anesthetic events: no    QCDR Measures:  ASA# 11 - Niyah-op Cardiac Arrest: ASA11B - Patient did NOT experience unanticipated cardiac arrest  ASA# 12 - Niyah-op Mortality Rate: ASA12B - Patient did NOT die  ASA# 13 - PACU Re-Intubation Rate: ASA13B - Patient did NOT require a new airway mgmt  ASA# 10 - Composite Anes Safety: ASA10A - No serious adverse event    Additional Notes:

## 2021-06-10 NOTE — PROGRESS NOTES
"Assessment/Plan:        Diagnoses and all orders for this visit:    Urinary frequency      Stone Management Plan  [facility] 6/22/2020 7/20/2020 8/17/2020   Urinary Tract Infection No suspicion of infection No suspicion of infection No suspicion of infection   Renal Colic Inadequate outpatient management of symptoms Asymptomatic at this time Asymptomatic at this time   Renal Failure No suspicion of renal failure No suspicion of renal failure No suspicion of renal failure   Current CT date 6/21/2020 7/2/2020 -   Right sided stones? Yes No -   R Number of ureteral stones 1 - -   R GSD of ureteral stones 6 - -   R Location of ureteral stone Mid - -   R Number of kidney stones  1 - -   R GSD of kidney stones 2 - 4 - -   R Hydronephrosis Mild Mild -   R Stone Event New event Resolved event -   Diagnosis date 6/21/2020 - -   Initial location of primary symptomatic stone Mid - -   Initial GSD of primary symptomatic stone 6 - -   Resolved date - 7/20/2020 -   R Post-op status - No residual stone -   R Current Plan Clear - -   Clear rationale Symptomatic - -   L Stone Event No current event No current event -             Phone call duration: 12 minutes    Washington Moses MD     Subjective:      The patient has been notified of following:     \"This telephone visit will be conducted via a call between you and your physician/provider. We have found that certain health care needs can be provided without the need for a physical exam.  This service lets us provide the care you need with a short phone conversation.  If a prescription is necessary we can send it directly to your pharmacy.  If labs and/or imaging are needed, we can place orders so you can have the test (s) done at a later time.    If during the course of the call the physician/provider feels a telephone visit is not appropriate, you will not be charged for this service.\"     HPI  Ms. Kenia Dumont is a 54 y.o.  female who is being evaluated " via a billable telephone visit by [facility] for follow up of post op urinary frequency    She has done well after 2 week course of Detrol. She is back to baseline of 1-2 episodes of nocturia. No other voiding symptoms. She did not have exacerbation of symptoms on Detrol withdrawal.    Will follow PRN. Reminded her that consistent hydration will be important for future stone prevention. If she increases fluid earlier in the day, should be less impact on nocturia.    Happy to see at any time on a PRN basis.     ROS   Review of systems is negative except for HPI.    Past Medical History:   Diagnosis Date     Anxiety      Hyperlipidemia      Kidney stone        Past Surgical History:   Procedure Laterality Date     CYSTOSCOPY W/ URETERAL STENT PLACEMENT Right 6/23/2020    Procedure: CYSTOSCOPY, WITH FLEXIBLE URETEROSCOPIC CALCULUS REMOVAL LASER LITHOTRIPSY  AND STENT INSERTION RIGHT;  Surgeon: Washington Moses MD;  Location: [facility];  Service: Urology     OVARY SURGERY         Current Outpatient Medications   Medication Sig Dispense Refill     atorvastatin (LIPITOR) 20 MG tablet Take 20 mg by mouth at bedtime.       busPIRone (BUSPAR) 15 MG tablet Take 15 mg by mouth at bedtime.       Lactobacillus acidoph-pectin (ACIDOPHILUS-PECTIN) 75 million cell -100 mg cap capsule Take 1 capsule by mouth at bedtime.       melatonin 3 mg Tab tablet Take 3 mg by mouth at bedtime as needed.       omeprazole (PRILOSEC) 20 MG capsule Take 20 mg by mouth at bedtime.       traZODone (DESYREL) 100 MG tablet Take 100 mg by mouth at bedtime.       tolterodine (DETROL) 2 MG tablet Take 1 tablet (2 mg total) by mouth 2 (two) times a day for 14 days. 28 tablet 0     No current facility-administered medications for this visit.        Allergies   Allergen Reactions     Tetracyclines Unknown     Zyban [Bupropion Hcl] Unknown       Social History     Socioeconomic History     Marital status:       Spouse name: Not on file     Number of children: Not on file     Years of education: Not on file     Highest education level: Not on file   Occupational History     Not on file   Social Needs     Financial resource strain: Not on file     Food insecurity     Worry: Not on file     Inability: Not on file     Transportation needs     Medical: Not on file     Non-medical: Not on file   Tobacco Use     Smoking status: Current Every Day Smoker     Types: Cigarettes     Smokeless tobacco: Current User     Tobacco comment: 4-5 cigarettes per day   Substance and Sexual Activity     Alcohol use: Not Currently     Drug use: No     Sexual activity: Not Currently   Lifestyle     Physical activity     Days per week: Not on file     Minutes per session: Not on file     Stress: Not on file   Relationships     Social connections     Talks on phone: Not on file     Gets together: Not on file     Attends Episcopalian service: Not on file     Active member of club or organization: Not on file     Attends meetings of clubs or organizations: Not on file     Relationship status: Not on file     Intimate partner violence     Fear of current or ex partner: Not on file     Emotionally abused: Not on file     Physically abused: Not on file     Forced sexual activity: Not on file   Other Topics Concern     Not on file   Social History Narrative     Not on file       No family history on file.    Objective:      Labs   Urinalysis POC (Office):  Nitrite, UA   Date Value Ref Range Status   07/02/2020 Negative Negative Final   06/21/2020 Negative Negative Final   10/11/2018 Negative Negative Final       Lab Urinalysis:  Blood, UA   Date Value Ref Range Status   07/02/2020 Small (!) Negative Final   06/21/2020 Moderate (!) Negative Final   10/11/2018 Negative Negative Final     Nitrite, UA   Date Value Ref Range Status   07/02/2020 Negative Negative Final   06/21/2020 Negative Negative Final   10/11/2018 Negative Negative Final     Leukocytes, UA    Date Value Ref Range Status   07/02/2020 Large (!) Negative Final   06/21/2020 Small (!) Negative Final   10/11/2018 Negative Negative Final     pH, UA   Date Value Ref Range Status   07/02/2020 5.5 4.5 - 8.0 Final   06/21/2020 5.5 4.5 - 8.0 Final   10/11/2018 5.0 4.5 - 8.0 Final

## 2021-06-16 PROBLEM — N20.1 CALCULUS OF URETER: Status: ACTIVE | Noted: 2020-06-22

## 2021-06-16 PROBLEM — N20.0 CALCULUS OF KIDNEY: Status: ACTIVE | Noted: 2020-06-22

## 2022-07-29 ENCOUNTER — LAB REQUISITION (OUTPATIENT)
Dept: LAB | Facility: CLINIC | Age: 57
End: 2022-07-29

## 2022-07-29 DIAGNOSIS — R63.5 ABNORMAL WEIGHT GAIN: ICD-10-CM

## 2022-07-29 DIAGNOSIS — E78.5 HYPERLIPIDEMIA, UNSPECIFIED: ICD-10-CM

## 2022-07-29 LAB
ANION GAP SERPL CALCULATED.3IONS-SCNC: 8 MMOL/L (ref 7–15)
BUN SERPL-MCNC: 15.6 MG/DL (ref 6–20)
CALCIUM SERPL-MCNC: 9.4 MG/DL (ref 8.6–10)
CHLORIDE SERPL-SCNC: 105 MMOL/L (ref 98–107)
CHOLEST SERPL-MCNC: 240 MG/DL
CREAT SERPL-MCNC: 0.79 MG/DL (ref 0.51–0.95)
DEPRECATED HCO3 PLAS-SCNC: 25 MMOL/L (ref 22–29)
GFR SERPL CREATININE-BSD FRML MDRD: 88 ML/MIN/1.73M2
GLUCOSE SERPL-MCNC: 95 MG/DL (ref 70–99)
HDLC SERPL-MCNC: 56 MG/DL
LDLC SERPL CALC-MCNC: 178 MG/DL
NONHDLC SERPL-MCNC: 184 MG/DL
POTASSIUM SERPL-SCNC: 4.5 MMOL/L (ref 3.4–5.3)
SODIUM SERPL-SCNC: 138 MMOL/L (ref 136–145)
T4 FREE SERPL-MCNC: 0.88 NG/DL (ref 0.9–1.7)
TRIGL SERPL-MCNC: 31 MG/DL
TSH SERPL DL<=0.005 MIU/L-ACNC: 4.54 UIU/ML (ref 0.3–4.2)

## 2022-07-29 PROCEDURE — 84443 ASSAY THYROID STIM HORMONE: CPT | Performed by: PHYSICIAN ASSISTANT

## 2022-07-29 PROCEDURE — 80048 BASIC METABOLIC PNL TOTAL CA: CPT | Performed by: PHYSICIAN ASSISTANT

## 2022-07-29 PROCEDURE — 80061 LIPID PANEL: CPT | Performed by: PHYSICIAN ASSISTANT

## 2022-07-29 PROCEDURE — 84439 ASSAY OF FREE THYROXINE: CPT | Performed by: PHYSICIAN ASSISTANT

## 2022-09-06 ENCOUNTER — LAB REQUISITION (OUTPATIENT)
Dept: LAB | Facility: CLINIC | Age: 57
End: 2022-09-06

## 2022-09-06 DIAGNOSIS — E03.9 HYPOTHYROIDISM, UNSPECIFIED: ICD-10-CM

## 2022-09-06 LAB
T3 SERPL-MCNC: 103 NG/DL (ref 85–202)
T4 FREE SERPL-MCNC: 1.01 NG/DL (ref 0.9–1.7)
TSH SERPL DL<=0.005 MIU/L-ACNC: 2.61 UIU/ML (ref 0.3–4.2)

## 2022-09-06 PROCEDURE — 84443 ASSAY THYROID STIM HORMONE: CPT | Performed by: PHYSICIAN ASSISTANT

## 2022-09-06 PROCEDURE — 84439 ASSAY OF FREE THYROXINE: CPT | Performed by: PHYSICIAN ASSISTANT

## 2022-09-06 PROCEDURE — 84480 ASSAY TRIIODOTHYRONINE (T3): CPT | Performed by: PHYSICIAN ASSISTANT

## 2022-11-04 ENCOUNTER — ANCILLARY PROCEDURE (OUTPATIENT)
Dept: MAMMOGRAPHY | Facility: HOSPITAL | Age: 57
End: 2022-11-04
Attending: PHYSICIAN ASSISTANT
Payer: COMMERCIAL

## 2022-11-04 DIAGNOSIS — Z12.31 OTHER SCREENING MAMMOGRAM: ICD-10-CM

## 2022-11-04 PROCEDURE — 77067 SCR MAMMO BI INCL CAD: CPT

## 2022-11-15 ENCOUNTER — ANCILLARY PROCEDURE (OUTPATIENT)
Dept: MAMMOGRAPHY | Facility: CLINIC | Age: 57
End: 2022-11-15
Attending: PHYSICIAN ASSISTANT
Payer: COMMERCIAL

## 2022-11-15 DIAGNOSIS — N63.0 BREAST MASS: ICD-10-CM

## 2022-11-15 PROCEDURE — 76642 ULTRASOUND BREAST LIMITED: CPT | Mod: LT

## 2022-12-19 ENCOUNTER — TRANSFERRED RECORDS (OUTPATIENT)
Dept: HEALTH INFORMATION MANAGEMENT | Facility: CLINIC | Age: 57
End: 2022-12-19

## 2022-12-19 ENCOUNTER — LAB REQUISITION (OUTPATIENT)
Dept: LAB | Facility: CLINIC | Age: 57
End: 2022-12-19

## 2022-12-19 DIAGNOSIS — E78.5 HYPERLIPIDEMIA, UNSPECIFIED: ICD-10-CM

## 2022-12-19 LAB
ALBUMIN SERPL BCG-MCNC: 4.3 G/DL (ref 3.5–5.2)
ALP SERPL-CCNC: 112 U/L (ref 35–104)
ALT SERPL W P-5'-P-CCNC: 18 U/L (ref 10–35)
ANION GAP SERPL CALCULATED.3IONS-SCNC: 11 MMOL/L (ref 7–15)
AST SERPL W P-5'-P-CCNC: 15 U/L (ref 10–35)
BILIRUB SERPL-MCNC: <0.2 MG/DL
BUN SERPL-MCNC: 18.4 MG/DL (ref 6–20)
CALCIUM SERPL-MCNC: 9.6 MG/DL (ref 8.6–10)
CHLORIDE SERPL-SCNC: 106 MMOL/L (ref 98–107)
CHOLEST SERPL-MCNC: 241 MG/DL
CREAT SERPL-MCNC: 1.11 MG/DL (ref 0.51–0.95)
DEPRECATED HCO3 PLAS-SCNC: 25 MMOL/L (ref 22–29)
GFR SERPL CREATININE-BSD FRML MDRD: 58 ML/MIN/1.73M2
GLUCOSE SERPL-MCNC: 105 MG/DL (ref 70–99)
HDLC SERPL-MCNC: 55 MG/DL
LDLC SERPL CALC-MCNC: 160 MG/DL
NONHDLC SERPL-MCNC: 186 MG/DL
POTASSIUM SERPL-SCNC: 4.1 MMOL/L (ref 3.4–5.3)
PROT SERPL-MCNC: 6.3 G/DL (ref 6.4–8.3)
SODIUM SERPL-SCNC: 142 MMOL/L (ref 136–145)
TRIGL SERPL-MCNC: 128 MG/DL

## 2022-12-19 PROCEDURE — 80053 COMPREHEN METABOLIC PANEL: CPT | Performed by: PHYSICIAN ASSISTANT

## 2022-12-19 PROCEDURE — 80061 LIPID PANEL: CPT | Performed by: PHYSICIAN ASSISTANT

## 2022-12-22 ENCOUNTER — MEDICAL CORRESPONDENCE (OUTPATIENT)
Dept: HEALTH INFORMATION MANAGEMENT | Facility: CLINIC | Age: 57
End: 2022-12-22

## 2022-12-26 ENCOUNTER — TRANSCRIBE ORDERS (OUTPATIENT)
Dept: OTHER | Age: 57
End: 2022-12-26

## 2022-12-26 DIAGNOSIS — E66.9 OBESITY: Primary | ICD-10-CM

## 2023-01-10 ENCOUNTER — LAB REQUISITION (OUTPATIENT)
Dept: LAB | Facility: CLINIC | Age: 58
End: 2023-01-10

## 2023-01-10 DIAGNOSIS — E78.5 HYPERLIPIDEMIA, UNSPECIFIED: ICD-10-CM

## 2023-01-10 LAB
ALBUMIN SERPL BCG-MCNC: 4.4 G/DL (ref 3.5–5.2)
ALP SERPL-CCNC: 122 U/L (ref 35–104)
ALT SERPL W P-5'-P-CCNC: 31 U/L (ref 10–35)
ANION GAP SERPL CALCULATED.3IONS-SCNC: 13 MMOL/L (ref 7–15)
AST SERPL W P-5'-P-CCNC: 25 U/L (ref 10–35)
BILIRUB SERPL-MCNC: 0.3 MG/DL
BUN SERPL-MCNC: 11.3 MG/DL (ref 6–20)
CALCIUM SERPL-MCNC: 9.5 MG/DL (ref 8.6–10)
CHLORIDE SERPL-SCNC: 102 MMOL/L (ref 98–107)
CREAT SERPL-MCNC: 0.93 MG/DL (ref 0.51–0.95)
DEPRECATED HCO3 PLAS-SCNC: 23 MMOL/L (ref 22–29)
GFR SERPL CREATININE-BSD FRML MDRD: 72 ML/MIN/1.73M2
GLUCOSE SERPL-MCNC: 125 MG/DL (ref 70–99)
POTASSIUM SERPL-SCNC: 4.4 MMOL/L (ref 3.4–5.3)
PROT SERPL-MCNC: 6.6 G/DL (ref 6.4–8.3)
SODIUM SERPL-SCNC: 138 MMOL/L (ref 136–145)

## 2023-01-10 PROCEDURE — 80053 COMPREHEN METABOLIC PANEL: CPT | Performed by: PHYSICIAN ASSISTANT

## 2023-02-03 ENCOUNTER — LAB REQUISITION (OUTPATIENT)
Dept: LAB | Facility: CLINIC | Age: 58
End: 2023-02-03

## 2023-02-03 DIAGNOSIS — R73.09 OTHER ABNORMAL GLUCOSE: ICD-10-CM

## 2023-02-03 LAB — HBA1C MFR BLD: 5.8 %

## 2023-02-03 PROCEDURE — 83036 HEMOGLOBIN GLYCOSYLATED A1C: CPT | Performed by: PHYSICIAN ASSISTANT

## 2023-04-17 ENCOUNTER — LAB REQUISITION (OUTPATIENT)
Dept: LAB | Facility: CLINIC | Age: 58
End: 2023-04-17

## 2023-04-17 DIAGNOSIS — M79.10 MYALGIA, UNSPECIFIED SITE: ICD-10-CM

## 2023-04-17 DIAGNOSIS — E03.9 HYPOTHYROIDISM, UNSPECIFIED: ICD-10-CM

## 2023-04-17 LAB
ALBUMIN SERPL BCG-MCNC: 4.6 G/DL (ref 3.5–5.2)
ALP SERPL-CCNC: 126 U/L (ref 35–104)
ALT SERPL W P-5'-P-CCNC: 20 U/L (ref 10–35)
ANION GAP SERPL CALCULATED.3IONS-SCNC: 13 MMOL/L (ref 7–15)
AST SERPL W P-5'-P-CCNC: 17 U/L (ref 10–35)
BILIRUB SERPL-MCNC: 0.2 MG/DL
BUN SERPL-MCNC: 11.4 MG/DL (ref 6–20)
CALCIUM SERPL-MCNC: 9.6 MG/DL (ref 8.6–10)
CHLORIDE SERPL-SCNC: 103 MMOL/L (ref 98–107)
CREAT SERPL-MCNC: 0.87 MG/DL (ref 0.51–0.95)
CRP SERPL-MCNC: <3 MG/L
DEPRECATED HCO3 PLAS-SCNC: 23 MMOL/L (ref 22–29)
ERYTHROCYTE [SEDIMENTATION RATE] IN BLOOD BY WESTERGREN METHOD: 10 MM/HR (ref 0–30)
GFR SERPL CREATININE-BSD FRML MDRD: 78 ML/MIN/1.73M2
GLUCOSE SERPL-MCNC: 99 MG/DL (ref 70–99)
POTASSIUM SERPL-SCNC: 4.2 MMOL/L (ref 3.4–5.3)
PROT SERPL-MCNC: 6.7 G/DL (ref 6.4–8.3)
SODIUM SERPL-SCNC: 139 MMOL/L (ref 136–145)
TSH SERPL DL<=0.005 MIU/L-ACNC: 7.24 UIU/ML (ref 0.3–4.2)

## 2023-04-17 PROCEDURE — 80053 COMPREHEN METABOLIC PANEL: CPT | Performed by: PHYSICIAN ASSISTANT

## 2023-04-17 PROCEDURE — 84443 ASSAY THYROID STIM HORMONE: CPT | Performed by: PHYSICIAN ASSISTANT

## 2023-04-17 PROCEDURE — 86038 ANTINUCLEAR ANTIBODIES: CPT | Performed by: PHYSICIAN ASSISTANT

## 2023-04-17 PROCEDURE — 85652 RBC SED RATE AUTOMATED: CPT | Performed by: PHYSICIAN ASSISTANT

## 2023-04-17 PROCEDURE — 86431 RHEUMATOID FACTOR QUANT: CPT | Performed by: PHYSICIAN ASSISTANT

## 2023-04-17 PROCEDURE — 86140 C-REACTIVE PROTEIN: CPT | Performed by: PHYSICIAN ASSISTANT

## 2023-04-18 LAB
ANA PAT SER IF-IMP: ABNORMAL
ANA SER QL IF: ABNORMAL
ANA TITR SER IF: ABNORMAL {TITER}
RHEUMATOID FACT SER NEPH-ACNC: <7 IU/ML

## 2023-05-31 ENCOUNTER — ANCILLARY PROCEDURE (OUTPATIENT)
Dept: MAMMOGRAPHY | Facility: CLINIC | Age: 58
End: 2023-05-31
Attending: PHYSICIAN ASSISTANT
Payer: COMMERCIAL

## 2023-05-31 DIAGNOSIS — N63.0 BREAST MASS: ICD-10-CM

## 2023-05-31 DIAGNOSIS — Z09 FOLLOW-UP EXAM: ICD-10-CM

## 2023-05-31 PROCEDURE — 77061 BREAST TOMOSYNTHESIS UNI: CPT | Mod: LT

## 2023-07-03 ENCOUNTER — LAB REQUISITION (OUTPATIENT)
Dept: LAB | Facility: CLINIC | Age: 58
End: 2023-07-03

## 2023-07-03 DIAGNOSIS — E03.9 HYPOTHYROIDISM, UNSPECIFIED: ICD-10-CM

## 2023-07-03 LAB
T3 SERPL-MCNC: 129 NG/DL (ref 85–202)
T4 FREE SERPL-MCNC: 1.31 NG/DL (ref 0.9–1.7)
TSH SERPL DL<=0.005 MIU/L-ACNC: 0.88 UIU/ML (ref 0.3–4.2)

## 2023-07-03 PROCEDURE — 84480 ASSAY TRIIODOTHYRONINE (T3): CPT | Performed by: PHYSICIAN ASSISTANT

## 2023-07-03 PROCEDURE — 84439 ASSAY OF FREE THYROXINE: CPT | Performed by: PHYSICIAN ASSISTANT

## 2023-07-03 PROCEDURE — 84443 ASSAY THYROID STIM HORMONE: CPT | Performed by: PHYSICIAN ASSISTANT

## 2023-09-18 ENCOUNTER — LAB REQUISITION (OUTPATIENT)
Dept: LAB | Facility: CLINIC | Age: 58
End: 2023-09-18

## 2023-09-18 DIAGNOSIS — R10.9 UNSPECIFIED ABDOMINAL PAIN: ICD-10-CM

## 2023-09-18 LAB
ANION GAP SERPL CALCULATED.3IONS-SCNC: 13 MMOL/L (ref 7–15)
BUN SERPL-MCNC: 12.3 MG/DL (ref 6–20)
CALCIUM SERPL-MCNC: 8.9 MG/DL (ref 8.6–10)
CHLORIDE SERPL-SCNC: 102 MMOL/L (ref 98–107)
CREAT SERPL-MCNC: 0.9 MG/DL (ref 0.51–0.95)
DEPRECATED HCO3 PLAS-SCNC: 22 MMOL/L (ref 22–29)
EGFRCR SERPLBLD CKD-EPI 2021: 75 ML/MIN/1.73M2
GLUCOSE SERPL-MCNC: 105 MG/DL (ref 70–99)
POTASSIUM SERPL-SCNC: 3.6 MMOL/L (ref 3.4–5.3)
SODIUM SERPL-SCNC: 137 MMOL/L (ref 136–145)

## 2023-09-18 PROCEDURE — 80048 BASIC METABOLIC PNL TOTAL CA: CPT | Performed by: FAMILY MEDICINE

## 2023-09-18 PROCEDURE — 87086 URINE CULTURE/COLONY COUNT: CPT | Performed by: FAMILY MEDICINE

## 2023-09-20 LAB — BACTERIA UR CULT: NORMAL

## 2023-11-03 ENCOUNTER — LAB REQUISITION (OUTPATIENT)
Dept: LAB | Facility: CLINIC | Age: 58
End: 2023-11-03
Payer: COMMERCIAL

## 2023-11-03 DIAGNOSIS — N95.0 POSTMENOPAUSAL BLEEDING: ICD-10-CM

## 2023-11-03 DIAGNOSIS — N83.209 UNSPECIFIED OVARIAN CYST, UNSPECIFIED SIDE: ICD-10-CM

## 2023-11-03 LAB — CANCER AG125 SERPL-ACNC: 22 U/ML

## 2023-11-03 PROCEDURE — 88305 TISSUE EXAM BY PATHOLOGIST: CPT | Mod: 26 | Performed by: STUDENT IN AN ORGANIZED HEALTH CARE EDUCATION/TRAINING PROGRAM

## 2023-11-03 PROCEDURE — 86304 IMMUNOASSAY TUMOR CA 125: CPT | Mod: ORL | Performed by: OBSTETRICS & GYNECOLOGY

## 2023-11-03 PROCEDURE — 88305 TISSUE EXAM BY PATHOLOGIST: CPT | Mod: TC,ORL | Performed by: OBSTETRICS & GYNECOLOGY

## 2023-11-07 LAB
PATH REPORT.COMMENTS IMP SPEC: NORMAL
PATH REPORT.COMMENTS IMP SPEC: NORMAL
PATH REPORT.FINAL DX SPEC: NORMAL
PATH REPORT.GROSS SPEC: NORMAL
PATH REPORT.MICROSCOPIC SPEC OTHER STN: NORMAL
PATH REPORT.RELEVANT HX SPEC: NORMAL
PHOTO IMAGE: NORMAL

## 2023-11-13 ENCOUNTER — LAB REQUISITION (OUTPATIENT)
Dept: LAB | Facility: CLINIC | Age: 58
End: 2023-11-13

## 2023-11-13 DIAGNOSIS — E03.9 HYPOTHYROIDISM, UNSPECIFIED: ICD-10-CM

## 2023-11-13 PROCEDURE — 84443 ASSAY THYROID STIM HORMONE: CPT | Performed by: PHYSICIAN ASSISTANT

## 2023-11-13 PROCEDURE — 84439 ASSAY OF FREE THYROXINE: CPT | Performed by: PHYSICIAN ASSISTANT

## 2023-11-14 LAB
T4 FREE SERPL-MCNC: 1.09 NG/DL (ref 0.9–1.7)
TSH SERPL DL<=0.005 MIU/L-ACNC: 5.34 UIU/ML (ref 0.3–4.2)

## 2023-12-18 ENCOUNTER — ANCILLARY PROCEDURE (OUTPATIENT)
Dept: MAMMOGRAPHY | Facility: CLINIC | Age: 58
End: 2023-12-18
Attending: OBSTETRICS & GYNECOLOGY
Payer: COMMERCIAL

## 2023-12-18 DIAGNOSIS — R92.8 ABNORMAL MAMMOGRAM: ICD-10-CM

## 2023-12-18 PROCEDURE — 77062 BREAST TOMOSYNTHESIS BI: CPT

## 2024-02-23 ENCOUNTER — LAB REQUISITION (OUTPATIENT)
Dept: LAB | Facility: CLINIC | Age: 59
End: 2024-02-23

## 2024-02-23 DIAGNOSIS — E78.5 HYPERLIPIDEMIA, UNSPECIFIED: ICD-10-CM

## 2024-02-23 DIAGNOSIS — E03.9 HYPOTHYROIDISM, UNSPECIFIED: ICD-10-CM

## 2024-02-23 LAB
ALBUMIN SERPL BCG-MCNC: 4.4 G/DL (ref 3.5–5.2)
ALP SERPL-CCNC: 148 U/L (ref 40–150)
ALT SERPL W P-5'-P-CCNC: 64 U/L (ref 0–50)
ANION GAP SERPL CALCULATED.3IONS-SCNC: 14 MMOL/L (ref 7–15)
AST SERPL W P-5'-P-CCNC: 29 U/L (ref 0–45)
BILIRUB SERPL-MCNC: 0.4 MG/DL
BUN SERPL-MCNC: 14.5 MG/DL (ref 6–20)
CALCIUM SERPL-MCNC: 9.5 MG/DL (ref 8.6–10)
CHLORIDE SERPL-SCNC: 104 MMOL/L (ref 98–107)
CHOLEST SERPL-MCNC: 160 MG/DL
CREAT SERPL-MCNC: 0.91 MG/DL (ref 0.51–0.95)
DEPRECATED HCO3 PLAS-SCNC: 24 MMOL/L (ref 22–29)
EGFRCR SERPLBLD CKD-EPI 2021: 74 ML/MIN/1.73M2
FASTING STATUS PATIENT QL REPORTED: NORMAL
GLUCOSE SERPL-MCNC: 116 MG/DL (ref 70–99)
HDLC SERPL-MCNC: 51 MG/DL
LDLC SERPL CALC-MCNC: 89 MG/DL
NONHDLC SERPL-MCNC: 109 MG/DL
POTASSIUM SERPL-SCNC: 3.7 MMOL/L (ref 3.4–5.3)
PROT SERPL-MCNC: 6.8 G/DL (ref 6.4–8.3)
SODIUM SERPL-SCNC: 142 MMOL/L (ref 135–145)
TRIGL SERPL-MCNC: 102 MG/DL
TSH SERPL DL<=0.005 MIU/L-ACNC: 1.19 UIU/ML (ref 0.3–4.2)

## 2024-02-23 PROCEDURE — 80061 LIPID PANEL: CPT | Performed by: PHYSICIAN ASSISTANT

## 2024-02-23 PROCEDURE — 84443 ASSAY THYROID STIM HORMONE: CPT | Performed by: PHYSICIAN ASSISTANT

## 2024-02-23 PROCEDURE — 80053 COMPREHEN METABOLIC PANEL: CPT | Performed by: PHYSICIAN ASSISTANT

## 2024-06-11 ENCOUNTER — HOSPITAL ENCOUNTER (OUTPATIENT)
Facility: AMBULATORY SURGERY CENTER | Age: 59
End: 2024-06-11
Attending: COLON & RECTAL SURGERY
Payer: COMMERCIAL

## 2024-08-01 ENCOUNTER — OFFICE VISIT (OUTPATIENT)
Dept: INTERNAL MEDICINE | Facility: CLINIC | Age: 59
End: 2024-08-01
Payer: COMMERCIAL

## 2024-08-01 VITALS
WEIGHT: 221 LBS | SYSTOLIC BLOOD PRESSURE: 132 MMHG | HEIGHT: 66 IN | RESPIRATION RATE: 15 BRPM | BODY MASS INDEX: 35.52 KG/M2 | HEART RATE: 69 BPM | DIASTOLIC BLOOD PRESSURE: 72 MMHG | TEMPERATURE: 96.6 F | OXYGEN SATURATION: 96 %

## 2024-08-01 DIAGNOSIS — E78.2 MIXED HYPERLIPIDEMIA: ICD-10-CM

## 2024-08-01 DIAGNOSIS — R73.03 PREDIABETES: ICD-10-CM

## 2024-08-01 DIAGNOSIS — E03.9 ACQUIRED HYPOTHYROIDISM: Primary | ICD-10-CM

## 2024-08-01 DIAGNOSIS — E66.01 MORBID OBESITY (H): ICD-10-CM

## 2024-08-01 DIAGNOSIS — R45.84 ANHEDONIA: ICD-10-CM

## 2024-08-01 DIAGNOSIS — R53.83 OTHER FATIGUE: ICD-10-CM

## 2024-08-01 LAB
ERYTHROCYTE [DISTWIDTH] IN BLOOD BY AUTOMATED COUNT: 12.7 % (ref 10–15)
FERRITIN SERPL-MCNC: 77 NG/ML (ref 11–328)
HCT VFR BLD AUTO: 42.9 % (ref 35–47)
HGB BLD-MCNC: 14.1 G/DL (ref 11.7–15.7)
MCH RBC QN AUTO: 28.7 PG (ref 26.5–33)
MCHC RBC AUTO-ENTMCNC: 32.9 G/DL (ref 31.5–36.5)
MCV RBC AUTO: 87 FL (ref 78–100)
PLATELET # BLD AUTO: 278 10E3/UL (ref 150–450)
RBC # BLD AUTO: 4.92 10E6/UL (ref 3.8–5.2)
VIT B12 SERPL-MCNC: 374 PG/ML (ref 232–1245)
WBC # BLD AUTO: 5 10E3/UL (ref 4–11)

## 2024-08-01 PROCEDURE — G2211 COMPLEX E/M VISIT ADD ON: HCPCS | Performed by: INTERNAL MEDICINE

## 2024-08-01 PROCEDURE — 99204 OFFICE O/P NEW MOD 45 MIN: CPT | Performed by: INTERNAL MEDICINE

## 2024-08-01 PROCEDURE — 82728 ASSAY OF FERRITIN: CPT | Performed by: INTERNAL MEDICINE

## 2024-08-01 PROCEDURE — 36415 COLL VENOUS BLD VENIPUNCTURE: CPT | Performed by: INTERNAL MEDICINE

## 2024-08-01 PROCEDURE — 82607 VITAMIN B-12: CPT | Performed by: INTERNAL MEDICINE

## 2024-08-01 PROCEDURE — 85027 COMPLETE CBC AUTOMATED: CPT | Performed by: INTERNAL MEDICINE

## 2024-08-01 RX ORDER — DEXTROAMPHETAMINE SACCHARATE, AMPHETAMINE ASPARTATE, DEXTROAMPHETAMINE SULFATE AND AMPHETAMINE SULFATE 5; 5; 5; 5 MG/1; MG/1; MG/1; MG/1
20 TABLET ORAL PRN
COMMUNITY

## 2024-08-01 RX ORDER — LEVOTHYROXINE SODIUM 100 UG/1
100 TABLET ORAL DAILY
COMMUNITY

## 2024-08-01 RX ORDER — FAMOTIDINE 20 MG
2000 TABLET ORAL DAILY
COMMUNITY

## 2024-08-01 RX ORDER — DULOXETIN HYDROCHLORIDE 60 MG/1
60 CAPSULE, DELAYED RELEASE ORAL DAILY
COMMUNITY
End: 2024-08-01

## 2024-08-01 ASSESSMENT — PATIENT HEALTH QUESTIONNAIRE - PHQ9
SUM OF ALL RESPONSES TO PHQ QUESTIONS 1-9: 12
10. IF YOU CHECKED OFF ANY PROBLEMS, HOW DIFFICULT HAVE THESE PROBLEMS MADE IT FOR YOU TO DO YOUR WORK, TAKE CARE OF THINGS AT HOME, OR GET ALONG WITH OTHER PEOPLE: SOMEWHAT DIFFICULT
SUM OF ALL RESPONSES TO PHQ QUESTIONS 1-9: 12

## 2024-08-01 NOTE — PROGRESS NOTES
Assessment & Plan     (E03.9) Acquired hypothyroidism  (primary encounter diagnosis)  Comment: diagnosis in the last couple of years. Some dose adjustments, latest tsh was normal in Feb 2024.  Plan: originally it was thought that replacement would help her chronic fatigue and situational depression around weight gain and low energy. There has not been any clear relationship and has had no improvement in those sx since starting on this therapy. Will continue current dose for now    (E66.01) Morbid obesity (H)  Comment: gradual gain, no obvious cause per patient. Did have some success when on higher doses of Wegovy in the past though insurance had declined it late last year. Unclear if she has tried to see if coverage was there now thus far this year.  Plan: Semaglutide-Weight Management (WEGOVY) 0.25         MG/0.5ML pen        Will try again. Compounding or online treatment could be tried as next step. MTM referral an option.    (R45.84) Anhedonia  Comment: vs depression, somewhat chronic--feels this is related to weight gain and fatigue.  Plan: FLUoxetine (PROZAC) 20 MG capsule        Was on cymbalta at 60mg for a number of months now--started by prior PCP, but no meaningful improvement in some of her aches/pains in legs nor her outlook.  Recommended stopping cymbalta and starting prozac. Discussed that a 4 week follow up would be needed     (R53.83) Other fatigue  Comment: nonspecific, may be related to above.  Plan: CBC with platelets, Vitamin B12, Ferritin        Pertinent labs ordered.    (E78.2) Mixed hyperlipidemia  Comment: hx of  Plan: currently not on statin, likely can just watch for now.    (R73.03) Prediabetes  Comment: hx of  Plan: A1c was 6% earlier in year.  Lifestyle modifications reviewed.            The longitudinal plan of care for the diagnosis(es)/condition(s) as documented were addressed during this visit. Due to the added complexity in care, I will continue to support Kenia in the  "subsequent management and with ongoing continuity of care.           BMI  Estimated body mass index is 35.67 kg/m  as calculated from the following:    Height as of this encounter: 1.676 m (5' 6\").    Weight as of this encounter: 100.2 kg (221 lb).       Depression Screening Follow Up        8/1/2024    10:37 AM   PHQ   PHQ-9 Total Score 12   Q9: Thoughts of better off dead/self-harm past 2 weeks Not at all           Follow Up Actions Taken  Adjusted patient's anti-depressant medication.           Roderick Aquino is a 58 year old, presenting for the following health issues:  Establish Care and Thyroid Problem (Tx for thyroid issue since August 2022- has had increase of synthroid/\"I feel miserable\", low energy/fatigue, joint pain, brain fog, 45# weight gain)      8/1/2024    10:49 AM   Additional Questions   Roomed by CARMEN Green     History of Present Illness       Reason for visit:  Feeling tired low energy weight gain    She eats 2-3 servings of fruits and vegetables daily.She consumes 1 sweetened beverage(s) daily.She exercises with enough effort to increase her heart rate 9 or less minutes per day.  She exercises with enough effort to increase her heart rate 3 or less days per week.   She is taking medications regularly.                     Objective    /72 (BP Location: Left arm, Patient Position: Sitting, Cuff Size: Adult Regular)   Pulse 69   Temp (!) 96.6  F (35.9  C) (Tympanic)   Resp 15   Ht 1.702 m (5' 7\")   SpO2 96%   BMI 27.10 kg/m    Body mass index is 27.1 kg/m .  Physical Exam               Signed Electronically by: Cameron Barron MD    "

## 2024-08-07 ENCOUNTER — TELEPHONE (OUTPATIENT)
Dept: INTERNAL MEDICINE | Facility: CLINIC | Age: 59
End: 2024-08-07
Payer: COMMERCIAL

## 2024-08-07 NOTE — TELEPHONE ENCOUNTER
Semaglutide-Weight Management (WEGOVY) 0.25 MG/0.5ML pen 2 mL 0 8/1/2024 -- --  Sig - Route: Inject 0.25 mg subcutaneously once a week - Subcutaneous  Sent to pharmacy as: Semaglutide-Weight Management 0.25 MG/0.5ML Subcutaneous Solution Auto-injector (WEGOVY)  Class: E-Prescribe  Order: [id number]  E-Prescribing Status: Receipt confirmed by pharmacy (8/1/2024 11:51 AM CDT)    Printout Tracking    External Result Report    Pharmacy    [id number] - [place], [place] - [facility]    Associated Diagnoses    Morbid obesity (H) [E66.01]      Source Order Set    Order Set Name Order ID [id number]      Prescribing Provider's NPI: [id number]  Cameron Barron    CoverMyMeds Key: [id number]

## 2024-08-08 NOTE — TELEPHONE ENCOUNTER
PRIOR AUTHORIZATION DENIED    Medication: WEGOVY 0.25 MG/0.5ML SC SOAJ  Insurance Company: [employer]-Specialty [employer]'s - Phone [phone] Fax [phone]  Denial Date: 8/8/2024  Denial Reason(s): PLAN EXCLUSION    This prior authorization has been denied as a drug/diagnosis exclusion.    Drug/Diagnosis exclusions cannot be appealed. Weight loss medications and diagnoses will not be covered by the prescription plan for any reason. There are no loopholes to gaining approval.    The patient is able to use [employer], [employer] or another discount card to help with the cost of the medication.    Please follow up with the patient and close the encounter.     Thank you!    Retail Pharmacy Prior Authorization Team   Phone: [phone]    Appeal Information: N/A  Patient Notified: NO

## 2024-08-09 NOTE — TELEPHONE ENCOUNTER
Please let her know that insurance will not pay for this medication.  As discussed, she could seek online evaluation for similar compounded medication or could pay out of pocket for this medication at her local pharmacy.  She can review coupon options as well such as Good [employer].    Cameron Barron MD on 8/9/2024 at 3:05 PM

## 2024-08-09 NOTE — TELEPHONE ENCOUNTER
Spoke with patient and relayed message from Dr. Barron.    Patient will check with local pharmacy for out of pocket cost with coupons/discount cards.    Will return call if MTM referral needed for assistance with compound pharmacy navigation.    No further questions at time of call.

## 2024-08-20 ENCOUNTER — NURSE TRIAGE (OUTPATIENT)
Dept: INTERNAL MEDICINE | Facility: CLINIC | Age: 59
End: 2024-08-20
Payer: COMMERCIAL

## 2024-08-20 NOTE — TELEPHONE ENCOUNTER
COVID Positive/Requesting COVID treatment    Patient is positive for COVID and requesting treatment options.    Date of positive COVID test (PCR or at home)? 8/19/24 at home test  Current COVID symptoms: fever or chills, cough, fatigue, muscle or body aches, headache, sore throat, and congestion or runny nose  Date COVID symptoms began: 8/19/24    Message should be routed to clinic RN pool. Best phone number to use for call back: [phone]

## 2024-08-20 NOTE — TELEPHONE ENCOUNTER
Nurse Triage SBAR    Is this a 2nd Level Triage? NO    Situation:  Positive for covid 8/19/24.    Background: Unsure when she was exposed. 58 year old presumed healthy female patient with minimal co morbidities listed.     Assessment: Patient reports headache, muscle aches, chills, fatigue and a bad cough. Denies any shortness of breath or chest pain. Eating and drinking adequately. Taking ibuprofen for body aches.     Protocol Recommended Disposition:   Home Care    Recommendation: Patient plans to follow homecare advice and will call in the next 2 days if symptoms are worsening and she wants to consider paxlovid.      Routed to provider    Does the patient meet one of the following criteria for ADS visit consideration? 16+ years old, with an [facility] PCP     TIP  Providers, please consider if this condition is appropriate for management at one of our Acute and Diagnostic Services sites.     If patient is a good candidate, please use dotphrase <dot>triageresponse and select Refer to ADS to document.        Reason for Disposition   COVID-19 diagnosed by positive lab test (e.g., PCR, rapid self-test kit) and NO symptoms (e.g., cough, fever, others)    Additional Information   Negative: SEVERE difficulty breathing (e.g., struggling for each breath, speaks in single words)   Negative: Difficult to awaken or acting confused (e.g., disoriented, slurred speech)   Negative: Bluish (or gray) lips or face now   Negative: Shock suspected (e.g., cold/pale/clammy skin, too weak to stand, low BP, rapid pulse)   Negative: Sounds like a life-threatening emergency to the triager   Negative: Diagnosed or suspected COVID-19 and symptoms lasting 3 or more weeks   Negative: COVID-19 exposure and no symptoms   Negative: COVID-19 vaccine reaction suspected (e.g., fever, headache, muscle aches) occurring 1 to 3 days after getting vaccine   Negative: COVID-19 vaccine, questions about   Negative: Lives with someone known to have influenza (flu  test positive) and flu-like symptoms (e.g., cough, runny nose, sore throat, SOB; with or without fever)   Negative: Possible COVID-19 symptoms and triager concerned about severity of symptoms or other causes   Negative: COVID-19 and breastfeeding, questions about   Negative: SEVERE or constant chest pain or pressure  (Exception: Mild central chest pain, present only when coughing.)   Negative: MODERATE difficulty breathing (e.g., speaks in phrases, SOB even at rest, pulse 100-120)   Negative: Headache and stiff neck (can't touch chin to chest)   Negative: Oxygen level (e.g., pulse oximetry) 90% or lower   Negative: Chest pain or pressure  (Exception: MILD central chest pain, present only when coughing.)   Negative: Drinking very little and dehydration suspected (e.g., no urine > 12 hours, very dry mouth, very lightheaded)   Negative: Patient sounds very sick or weak to the triager   Negative: MILD difficulty breathing (e.g., minimal/no SOB at rest, SOB with walking, pulse <100)   Negative: Fever > 103 F (39.4 C)   Negative: Fever > 101 F (38.3 C) and over 60 years of age   Negative: Fever > 100.0 F (37.8 C) and bedridden (e.g., CVA, chronic illness, recovering from surgery)   Negative: HIGH RISK patient (e.g., weak immune system, age > 64 years, obesity with BMI of 30 or higher, pregnant, chronic lung disease or other chronic medical condition) and COVID symptoms (e.g., cough, fever)  (Exceptions: Already seen by doctor or NP/PA and no new or worsening symptoms.)   Negative: HIGH RISK patient and influenza is widespread in the community and ONE OR MORE respiratory symptoms: cough, sore throat, runny or stuffy nose   Negative: HIGH RISK patient and influenza exposure within the last 7 days and ONE OR MORE respiratory symptoms: cough, sore throat, runny or stuffy nose   Negative: Oxygen level (e.g., pulse oximetry) 91 to 94%   Negative: COVID-19 infection suspected by caller or triager and mild symptoms (cough, fever,  "or others) and negative COVID-19 rapid test   Negative: Fever present > 3 days (72 hours)   Negative: Fever returns after gone for over 24 hours and symptoms worse or not improved   Negative: Continuous (nonstop) coughing interferes with work or school and no improvement using cough treatment per Care Advice   Negative: Cough present > 3 weeks    Answer Assessment - Initial Assessment Questions  1. COVID-19 DIAGNOSIS: \"How do you know that you have COVID?\" (e.g., positive lab test or self-test, diagnosed by doctor or NP/PA, symptoms after exposure).      8/19/24  2. COVID-19 EXPOSURE: \"Was there any known exposure to COVID before the symptoms began?\" CDC Definition of close contact: within 6 feet (2 meters) for a total of 15 minutes or more over a 24-hour period.       No I don't know.   3. ONSET: \"When did the COVID-19 symptoms start?\"       Monday morning had sore throat,  runny nose and cough. Also had body aches.   4. WORST SYMPTOM: \"What is your worst symptom?\" (e.g., cough, fever, shortness of breath, muscle aches)      Body aches.   5. COUGH: \"Do you have a cough?\" If Yes, ask: \"How bad is the cough?\"        Yes it hurts to cough  6. FEVER: \"Do you have a fever?\" If Yes, ask: \"What is your temperature, how was it measured, and when did it start?\"      I dont know but I feel warm   7. RESPIRATORY STATUS: \"Describe your breathing?\" (e.g., normal; shortness of breath, wheezing, unable to speak)       Breathing normal   8. BETTER-SAME-WORSE: \"Are you getting better, staying the same or getting worse compared to yesterday?\"  If getting worse, ask, \"In what way?\"      worse  9. OTHER SYMPTOMS: \"Do you have any other symptoms?\"  (e.g., chills, fatigue, headache, loss of smell or taste, muscle pain, sore throat)      No  10. HIGH RISK DISEASE: \"Do you have any chronic medical problems?\" (e.g., asthma, heart or lung disease, weak immune system, obesity, etc.)        No  11. VACCINE: \"Have you had the COVID-19 " "vaccine?\" If Yes, ask: \"Which one, how many shots, when did you get it?\"        Yes, last one 10/2023  12. PREGNANCY: \"Is there any chance you are pregnant?\" \"When was your last menstrual period?\"        No  13. O2 SATURATION MONITOR:  \"Do you use an oxygen saturation monitor (pulse oximeter) at home?\" If Yes, ask \"What is your reading (oxygen level) today?\" \"What is your usual oxygen saturation reading?\" (e.g., 95%)        No    Protocols used: Coronavirus (COVID-19) Diagnosed or [employer]    " PAST SURGICAL HISTORY:  No significant past surgical history

## 2024-09-05 ENCOUNTER — VIRTUAL VISIT (OUTPATIENT)
Dept: INTERNAL MEDICINE | Facility: CLINIC | Age: 59
End: 2024-09-05
Payer: COMMERCIAL

## 2024-09-05 DIAGNOSIS — E66.01 MORBID OBESITY (H): Primary | ICD-10-CM

## 2024-09-05 DIAGNOSIS — R45.84 ANHEDONIA: ICD-10-CM

## 2024-09-05 PROCEDURE — 99214 OFFICE O/P EST MOD 30 MIN: CPT | Mod: 95 | Performed by: INTERNAL MEDICINE

## 2024-09-05 PROCEDURE — G2211 COMPLEX E/M VISIT ADD ON: HCPCS | Mod: 95 | Performed by: INTERNAL MEDICINE

## 2024-09-05 RX ORDER — FLUOXETINE 40 MG/1
40 CAPSULE ORAL DAILY
Qty: 90 CAPSULE | Refills: 1 | Status: SHIPPED | OUTPATIENT
Start: 2024-09-05

## 2024-09-05 NOTE — PROGRESS NOTES
"Yuni is a 58 year old who is being evaluated via a billable video visit.    How would you like to obtain your AVS? MyChart  If the video visit is dropped, the invitation should be resent by: Text to cell phone: [phone]  Will anyone else be joining your video visit? No      Assessment & Plan     (E66.01) Morbid obesity (H)  (primary encounter diagnosis)  Comment: has lost a little weight over the last month on her own. Wegovy was not covered  Plan: Med Therapy Management Referral        MTM referral for weight loss options.     (R44.52) Anhedonia  Comment: some improvement noted on 20mg though still a ways to go per patient  Plan: FLUoxetine (PROZAC) 40 MG capsule        Will increase to 40mg    See me in 2-3 months, earlier if needed.    The longitudinal plan of care for the diagnosis(es)/condition(s) as documented were addressed during this visit. Due to the added complexity in care, I will continue to support Yuni in the subsequent management and with ongoing continuity of care.           BMI  Estimated body mass index is 35.67 kg/m  as calculated from the following:    Height as of 8/1/24: 1.676 m (5' 6\").    Weight as of 8/1/24: 100.2 kg (221 lb).             Subjective   Yuni is a 58 year old, presenting for the following health issues:  Follow Up and Recheck Medication (Pt reports that she is having this visit to discuss medication changes and weight loss medication.)      9/5/2024    11:30 AM   Additional Questions   Roomed by daniel   Accompanied by alone         9/5/2024    11:30 AM   Patient Reported Additional Medications   Patient reports taking the following new medications none     History of Present Illness       Reason for visit:  Medication recheck    She eats 2-3 servings of fruits and vegetables daily.She consumes 0 sweetened beverage(s) daily.She exercises with enough effort to increase her heart rate 60 or more minutes per day.  She exercises with enough effort to increase her heart rate 4 " days per week.   She is taking medications regularly.                   Objective           Vitals:  No vitals were obtained today due to virtual visit.    Physical Exam   GENERAL: alert and no distress  EYES: Eyes grossly normal to inspection.  No discharge or erythema, or obvious scleral/conjunctival abnormalities.  RESP: No audible wheeze, cough, or visible cyanosis.    SKIN: Visible skin clear. No significant rash, abnormal pigmentation or lesions.  NEURO: Cranial nerves grossly intact.  Mentation and speech appropriate for age.  PSYCH: Appropriate affect, tone, and pace of words          Video-Visit Details    Type of service:  Video Visit   Originating Location (pt. Location): Home    Distant Location (provider location):  Off-site  Platform used for Video Visit: Rhonda  Signed Electronically by: Cameron Barron MD

## 2024-09-09 ENCOUNTER — TELEPHONE (OUTPATIENT)
Dept: INTERNAL MEDICINE | Facility: CLINIC | Age: 59
End: 2024-09-09
Payer: COMMERCIAL

## 2024-09-09 NOTE — TELEPHONE ENCOUNTER
MTM referral from: [facility] visit (referral by provider)    MT referral outreach attempt #2 on September 9, 2024 at 12:58 PM      Outcome: Patient not reachable after several attempts, sent [employer] message    Use medica bold for the carrier/Plan on the flowsheet      MyChart Message Sent    See Refugio  [facility]   [phone]

## 2024-09-13 ENCOUNTER — VIRTUAL VISIT (OUTPATIENT)
Dept: PHARMACY | Facility: CLINIC | Age: 59
End: 2024-09-13
Attending: INTERNAL MEDICINE
Payer: COMMERCIAL

## 2024-09-13 DIAGNOSIS — E03.8 OTHER SPECIFIED HYPOTHYROIDISM: ICD-10-CM

## 2024-09-13 DIAGNOSIS — F32.89 OTHER DEPRESSION: ICD-10-CM

## 2024-09-13 DIAGNOSIS — E66.812 CLASS 2 OBESITY DUE TO EXCESS CALORIES WITHOUT SERIOUS COMORBIDITY WITH BODY MASS INDEX (BMI) OF 35.0 TO 35.9 IN ADULT: Primary | ICD-10-CM

## 2024-09-13 DIAGNOSIS — G47.09 OTHER INSOMNIA: ICD-10-CM

## 2024-09-13 DIAGNOSIS — E78.5 HYPERLIPIDEMIA LDL GOAL <100: ICD-10-CM

## 2024-09-13 DIAGNOSIS — F90.8 ATTENTION DEFICIT HYPERACTIVITY DISORDER (ADHD), OTHER TYPE: ICD-10-CM

## 2024-09-13 DIAGNOSIS — E66.09 CLASS 2 OBESITY DUE TO EXCESS CALORIES WITHOUT SERIOUS COMORBIDITY WITH BODY MASS INDEX (BMI) OF 35.0 TO 35.9 IN ADULT: Primary | ICD-10-CM

## 2024-09-13 DIAGNOSIS — Z78.9 TAKES DIETARY SUPPLEMENTS: ICD-10-CM

## 2024-09-13 DIAGNOSIS — F41.9 ANXIETY: ICD-10-CM

## 2024-09-13 PROCEDURE — 99207 PR NO CHARGE LOS: CPT | Mod: 93

## 2024-09-13 RX ORDER — IBUPROFEN 600 MG/1
800 TABLET, FILM COATED ORAL PRN
COMMUNITY
Start: 2023-12-06

## 2024-09-13 NOTE — PATIENT INSTRUCTIONS
"Recommendations from today's MTM visit:                                                      MTM (medication therapy management) is a service provided by a clinical pharmacist designed to help you get the most of out of your medicines.   Today we reviewed what your medicines are for, how to know if they are working, that your medicines are safe and how to make your medicine regimen as easy as possible.      Hold the atorvastatin 20  mg for four days and check the difference, and if muscle pain is relieved we will switch atorvastatin to rosuvastatin   Patient will decide if she is going to afford wegovy. Wegovy price was communicated with patient via [employer]     Follow-up: Due on when needed     It was great speaking with you today.  I value your experience and would be very thankful for your time in providing feedback in our clinic survey. In the next few days, you may receive an email or text message from [employer] with a link to a survey related to your  clinical pharmacist.\"     To schedule another MTM appointment, please call the clinic directly or you may call the MTM scheduling line at [phone].    My Clinical Pharmacist's contact information:                                                      Please feel free to contact me with any questions or concerns you have.        Ramírez Larkin, PharmD     Medication Therapy Management (MTM) Pharmacist     [phone]     [email or web address]     [facility]    "

## 2024-09-13 NOTE — PROGRESS NOTES
Medication Therapy Management (MTM) Encounter    ASSESSMENT:                            Medication Adherence/Access: No issues identified    Weight Management: Considering patient's weight reasonable to start taking weight loss medications. Insurance does not cover weight loss medications and the best options is to use compounded Wegovy or Zepbound. Considering Wegovy is cheaper than Zepbound reasonable to use it when patient is ready. Advised patient to eat healthy, try intermittent fasting and also sleep with empty stomach. Will follow up with patient.     Hyperlipidemia: Since patient noted she has some muscle pain reasonable to hold it for 4- 5 days to see if the symptoms would go away. If it does we will switch atorvastatin to rosuvastatin.     Mental Health   Anxiety, Depression, and Insomnia: Sleeping better however, mood is not fully stable. Fluoxetine dose was recently increased. Will wait for some time until the medication reaches its full effect.     Hypothyroidism: Thyroid levels are within goal, but patient thinks she is gaining weight and still tired. Unsure, but we can use a combination T3 and T4. Unsure also if patient is struggling with sometime of heart issues that is making her swell a little bit around her hip and feet. She gets tired and breathing heavy on exertion. It is reasonable to do some heart stress tests. Patient could communicate with PCP.      ADHD: Relatively stable on adderall, but she still struggles. Recently, dose was increased to one and half tablet, but has not started it yet.     Dietary Supplements: Continue taking current dietary supplements     PLAN:                            Hold the atorvastatin 20  mg for four days and check the difference, and if muscle pain is relieved we will switch atorvastatin to rosuvastatin   Patient will decide if she is going to afford wegovy. Wegovy price was communicated with patient via [employer]     Follow-up: Due on when needed  "    SUBJECTIVE/OBJECTIVE:                          Yuni Dumont is a 58 year old female seen for an initial visit. She was referred to me from Dr. Barron.      Reason for visit: Weight management     Allergies/ADRs: Reviewed in chart  Past Medical History: Reviewed in chart  Tobacco: She reports that she quit smoking about 2 years ago. Her smoking use included cigarettes. She uses smokeless tobacco.      Medication Adherence/Access: no issues reported     Weight Management    Not taking any medication at this time  She was on Wegovy last year. She had some hypothyroidism. She has been having tiredness even though her TSH is within goal. Patient noted she gained 45 pounds. She is having body aches. The last time she lost weight was when she started taking 2.4 mg of wegovy. She noted he is resistant to medications and higher doses work for her. Patient has a lot of questions about weight loss medication options mostly compounded wegovy or Zepbound.     Nutrition/Eating Habits: Sometimes she gets busy at work and she does not eat lunch, but she has granula bar..etc for breakfast  or lunch. When busy she does not eat lunch, but then goes to her home and eats tuna sandwich. She eats dinner between 6 and 7 PM. She sleeps around 11 PM to 5:30 AM. She snacks later in the evening after dinner. She seems to get hungry at night than the day. Patient noted she did not try intermittent fasting.     Exercise/Activity: Patient walks, and  rides the bike. She bought a mini bounder and she will be using it soon.   Medications Tried/Failed:     Current weight today: 0 lbs 0 oz    Wt Readings from Last 4 Encounters:   08/01/24 221 lb (100.2 kg)   06/23/20 174 lb (78.9 kg)     Estimated body mass index is 35.67 kg/m  as calculated from the following:    Height as of 8/1/24: 5' 6\" (1.676 m).    Weight as of 8/1/24: 221 lb (100.2 kg).    Results:  Saxenda, Wegovy, and Zepbound not covered plan/benefit exclusion    BP Readings from Last 3 " "Encounters:   08/01/24 132/72     Hyperlipidemia    Atorvastatin 20 mg daily   Patient reports the following side effects: Muscle pain   The 10-year ASCVD risk score (Jc DK, et al., 2019) is: 2%    Values used to calculate the score:      Age: 58 years      Sex: Female      Is Non- : No      Diabetic: No      Tobacco smoker: No      Systolic Blood Pressure: 132 mmHg      Is BP treated: No      HDL Cholesterol: 51 mg/dL      Total Cholesterol: 160 mg/dL       Mental Health   Anxiety, Depression, and Insomnia  Fluoxetine 40 mg once daily  Trazodone 100 mg once daily.   Patient was taking duloxetine before, and Dr. Barron changed to fluoxetine and then increased the dose to 40 mg. Her mood has not been as she would need it. She is sleeping okay lately.   Patient reports no current medication side effects.       Hypothyroidism    Levothyroxine 100 mcg daily at bedtime  Patient is having the following symptoms: hypothyroidism -  myalgias, muscle cramps, weakness, fatigue, and weight gain and hyperthyroidism -  muscle weakness, she is always hot.  Patient thinks her thyroid is not up to level even though her TSH levels are normal.     ADHD:   Adderall 20 mg daily in the morning as needed   Patient noted this is working; she takes it during the working days. She does not take it during the week ends. She has an order sent by her other days and that is take one tablet and half. She has been taking just one tablet yet, but she plan to take one tablet and half. She noted she has high tolerance.     Dietary Supplements:   Vitamin D 25 mcg and takes 2000 international unit(s) daily    Probiotic one capsule daily     Vitamin D Deficiency Screening Results:  No results found for: \"VITDT\"      Today's Vitals: LMP  (LMP Unknown)   ----------------      I spent 55 minutes with this patient today. All changes were made via collaborative practice agreement with Cameron Barron MD. A copy of the visit " note was provided to the patient's provider(s).    A summary of these recommendations was sent via [employer].    Telemedicine Visit Details  Type of service:  Telephone visit  Start Time:  08:30 AM  End Time: 9:25 AM     Medication Therapy Recommendations  No medication therapy recommendations to display     Ramírez Larkin PharmD     Medication Therapy Management (MTM) Pharmacist     [phone]     [email or web address]

## 2024-10-04 ENCOUNTER — PATIENT OUTREACH (OUTPATIENT)
Dept: CARE COORDINATION | Facility: CLINIC | Age: 59
End: 2024-10-04
Payer: COMMERCIAL

## 2024-10-18 ENCOUNTER — PATIENT OUTREACH (OUTPATIENT)
Dept: CARE COORDINATION | Facility: CLINIC | Age: 59
End: 2024-10-18
Payer: COMMERCIAL

## 2024-12-04 ENCOUNTER — VIRTUAL VISIT (OUTPATIENT)
Dept: INTERNAL MEDICINE | Facility: CLINIC | Age: 59
End: 2024-12-04
Payer: COMMERCIAL

## 2024-12-04 DIAGNOSIS — R53.83 OTHER FATIGUE: Primary | ICD-10-CM

## 2024-12-04 PROCEDURE — 99442 PR PHYSICIAN TELEPHONE EVALUATION 11-20 MIN: CPT | Mod: 93 | Performed by: INTERNAL MEDICINE

## 2024-12-04 NOTE — PROGRESS NOTES
"Yuni is a 59 year old who is being evaluated via a billable telephone visit.    What phone number would you like to be contacted at? [phone]  How would you like to obtain your AVS? Val  Originating Location (pt. Location): Home    Distant Location (provider location):  On-site    Assessment & Plan     (R53.83) Other fatigue  (primary encounter diagnosis)  Comment: nonspecific, some PAYNE which had been attributed to sedentary lifestyle and deconditioning. Some fluctuations in tsh over the last two years as well. Increased ssri without much benefit. No new sx   Plan: Comprehensive metabolic panel, TSH with free T4        reflex, CK total, ESR: Erythrocyte         sedimentation rate, UA Macroscopic with reflex         to Microscopic and Culture - Lab Collect, BNP-N        terminal pro, D dimer, quantitative        Will continue to work up. If labs okay, then would pursue trial off of statin as well as tapering down of her ssri (seemed to lack benefit).              BMI  Estimated body mass index is 35.67 kg/m  as calculated from the following:    Height as of 8/1/24: 1.676 m (5' 6\").    Weight as of 8/1/24: 100.2 kg (221 lb).             Subjective   Yuni is a 59 year old, presenting for the following health issues:  office visit and Recheck Medication (Pt reports that she's having this visit to discuss medications. Pt also reports that she feels that the medications aren't working for her.)      12/4/2024     4:09 PM   Additional Questions   Roomed by daniel   Accompanied by alone         12/4/2024     4:09 PM   Patient Reported Additional Medications   Patient reports taking the following new medications none     History of Present Illness       Reason for visit:  Medication review   She is taking medications regularly.                   Objective           Vitals:  No vitals were obtained today due to virtual visit.    Physical Exam   General: Alert and no distress //Respiratory: No audible wheeze, cough, or " shortness of breath // Psychiatric:  Appropriate affect, tone, and pace of words            Phone call duration: 14 minutes  Signed Electronically by: Cameron Barron MD

## 2024-12-09 ENCOUNTER — MYC MEDICAL ADVICE (OUTPATIENT)
Dept: INTERNAL MEDICINE | Facility: CLINIC | Age: 59
End: 2024-12-09
Payer: COMMERCIAL

## 2024-12-10 ENCOUNTER — OFFICE VISIT (OUTPATIENT)
Dept: FAMILY MEDICINE | Facility: CLINIC | Age: 59
End: 2024-12-10
Payer: COMMERCIAL

## 2024-12-10 VITALS
RESPIRATION RATE: 19 BRPM | BODY MASS INDEX: 34.78 KG/M2 | TEMPERATURE: 97.8 F | HEART RATE: 74 BPM | DIASTOLIC BLOOD PRESSURE: 74 MMHG | OXYGEN SATURATION: 98 % | SYSTOLIC BLOOD PRESSURE: 117 MMHG | HEIGHT: 67 IN | WEIGHT: 221.6 LBS

## 2024-12-10 DIAGNOSIS — L98.9 SKIN LESION: ICD-10-CM

## 2024-12-10 DIAGNOSIS — R10.9 RIGHT FLANK PAIN: Primary | ICD-10-CM

## 2024-12-10 DIAGNOSIS — N20.0 CALCULUS OF KIDNEY: ICD-10-CM

## 2024-12-10 LAB
ALBUMIN UR-MCNC: NEGATIVE MG/DL
ANION GAP SERPL CALCULATED.3IONS-SCNC: 8 MMOL/L (ref 7–15)
APPEARANCE UR: CLEAR
BASOPHILS # BLD AUTO: 0 10E3/UL (ref 0–0.2)
BASOPHILS NFR BLD AUTO: 1 %
BILIRUB UR QL STRIP: NEGATIVE
BUN SERPL-MCNC: 17.6 MG/DL (ref 6–20)
CALCIUM SERPL-MCNC: 9.6 MG/DL (ref 8.8–10.4)
CHLORIDE SERPL-SCNC: 104 MMOL/L (ref 98–107)
COLOR UR AUTO: YELLOW
CREAT SERPL-MCNC: 0.91 MG/DL (ref 0.51–0.95)
EGFRCR SERPLBLD CKD-EPI 2021: 73 ML/MIN/1.73M2
EOSINOPHIL # BLD AUTO: 0.1 10E3/UL (ref 0–0.7)
EOSINOPHIL NFR BLD AUTO: 2 %
ERYTHROCYTE [DISTWIDTH] IN BLOOD BY AUTOMATED COUNT: 13 % (ref 10–15)
GLUCOSE SERPL-MCNC: 77 MG/DL (ref 70–99)
GLUCOSE UR STRIP-MCNC: NEGATIVE MG/DL
HCO3 SERPL-SCNC: 26 MMOL/L (ref 22–29)
HCT VFR BLD AUTO: 40.1 % (ref 35–47)
HGB BLD-MCNC: 12.8 G/DL (ref 11.7–15.7)
HGB UR QL STRIP: NEGATIVE
IMM GRANULOCYTES # BLD: 0 10E3/UL
IMM GRANULOCYTES NFR BLD: 0 %
KETONES UR STRIP-MCNC: NEGATIVE MG/DL
LEUKOCYTE ESTERASE UR QL STRIP: NEGATIVE
LYMPHOCYTES # BLD AUTO: 1.1 10E3/UL (ref 0.8–5.3)
LYMPHOCYTES NFR BLD AUTO: 19 %
MCH RBC QN AUTO: 28.6 PG (ref 26.5–33)
MCHC RBC AUTO-ENTMCNC: 31.9 G/DL (ref 31.5–36.5)
MCV RBC AUTO: 90 FL (ref 78–100)
MONOCYTES # BLD AUTO: 0.4 10E3/UL (ref 0–1.3)
MONOCYTES NFR BLD AUTO: 7 %
NEUTROPHILS # BLD AUTO: 4.1 10E3/UL (ref 1.6–8.3)
NEUTROPHILS NFR BLD AUTO: 71 %
NITRATE UR QL: NEGATIVE
PH UR STRIP: 5.5 [PH] (ref 5–8)
PLATELET # BLD AUTO: 291 10E3/UL (ref 150–450)
POTASSIUM SERPL-SCNC: 4.3 MMOL/L (ref 3.4–5.3)
RBC # BLD AUTO: 4.48 10E6/UL (ref 3.8–5.2)
SODIUM SERPL-SCNC: 138 MMOL/L (ref 135–145)
SP GR UR STRIP: >=1.03 (ref 1–1.03)
UROBILINOGEN UR STRIP-ACNC: 0.2 E.U./DL
WBC # BLD AUTO: 5.8 10E3/UL (ref 4–11)

## 2024-12-10 PROCEDURE — 99214 OFFICE O/P EST MOD 30 MIN: CPT

## 2024-12-10 PROCEDURE — 36415 COLL VENOUS BLD VENIPUNCTURE: CPT

## 2024-12-10 PROCEDURE — 81003 URINALYSIS AUTO W/O SCOPE: CPT

## 2024-12-10 PROCEDURE — 85025 COMPLETE CBC W/AUTO DIFF WBC: CPT

## 2024-12-10 PROCEDURE — 80048 BASIC METABOLIC PNL TOTAL CA: CPT

## 2024-12-10 PROCEDURE — G2211 COMPLEX E/M VISIT ADD ON: HCPCS

## 2024-12-10 RX ORDER — NAPROXEN 500 MG/1
500 TABLET ORAL 2 TIMES DAILY WITH MEALS
Qty: 15 TABLET | Refills: 0 | Status: SHIPPED | OUTPATIENT
Start: 2024-12-10

## 2024-12-10 ASSESSMENT — PAIN SCALES - GENERAL: PAINLEVEL_OUTOF10: MODERATE PAIN (4)

## 2024-12-10 NOTE — PROGRESS NOTES
Assessment & Plan     (R10.9) Right flank pain  (primary encounter diagnosis)  (N20.0) Calculus of kidney  Comment: Chronic with acute flare.  Vital signs are stable, patient is nontoxic-appearing or in acute distress, no findings that would warrant the need for immediate medical attention.  Patient does have a history of renal calculi, and feels that some of her symptoms are very similar to this.  She is having unilateral flank pain that is intermittent, but is not having other symptoms such as blood in the urine, and has noted no expected passing of a kidney stone at this time.  Reports there is a possibility that flank pain could be musculoskeletal, but will update BMP, urinalysis, and CBC today to rule out more underlying etiology of the symptoms.  Considering history of kidney stones, educated patient that if lab workup today is nondescript, we could add on a renal ultrasound for further evaluation.  Patient is open to next steps, but educated her that oftentimes kidney stone management revolves around pain management and expected passing of the stone. Offered education on medications including appropriate dosing, possible side effects, and possible adverse effects.  Education given on return to clinic instructions as well as alarm signs that would require the need for immediate medical attention.  Patient attested to understanding.  Plan: UA Macroscopic with reflex to Microscopic and         Culture - Lab Collect, Basic metabolic panel          (Ca, Cl, CO2, Creat, Gluc, K, Na, BUN), CBC         with platelets and differential, naproxen         (NAPROSYN) 500 MG tablet    (L98.9) Skin lesion  Comment: Chronic and worsening.  Non-painful open sores across bilateral upper extremities.  No known current illness, no recent travel, and no more described physical exam findings are HPI findings that would attest to a specific diagnosis today.  CBC is stable, as some of the initial stages of the lesions look  somewhat like petechiae or small hematomas.  Getting patient over to dermatology for further workup and evaluation of this concern.  Plan: CBC with platelets and differential, Adult         Dermatology  Referral      This progress note has been dictated, with use of voice recognition software. Any grammatical, typographical, or context errors are unintentional and inherent to use of voice recognition software.     Ordering of each unique test  Prescription drug management  I spent a total of 21 minutes on the day of the visit.   Time spent by me today doing chart review, history and exam, documentation and further activities per the note    FUTURE APPOINTMENTS:       - Follow-up visit in 1 week if symptoms worsen or do not improve       - Follow-up for annual visit or as needed  There are no Patient Instructions on file for this visit.    Subjective   Yuni is a 59 year old, presenting for the following health issues:  office visit (Patient reports they are here with pain in R side lasting 1 week. Been working with Dr. Barron with other ongoing symptoms.  Wants to discuss seeing an endocrinologist for her thyroid. )        12/10/2024    11:10 AM   Additional Questions   Roomed by Flaca   Accompanied by alone         12/10/2024    11:10 AM   Patient Reported Additional Medications   Patient reports taking the following new medications none     History of Present Illness       Reason for visit:  Medication review   She is taking medications regularly.    Yuni is a 59-year-old female with a past medical history significant for hypothyroidism and kidney stones who presents today with concerns for flank pain and lesions of her extremities.  Patient does have a history of kidney stones that have been most prominent a couple of years ago.  She notes that approximately 1 week ago she began to experience pain in her right flank that became its worst last evening, and has since improved.  She notes that the pain is  "intermittent, but when it presents, it is quite severe.  She declines any left-sided flank pain, but notes that occasionally there is some discomfort in her right lower abdomen that does not always correlate with the right flank pain.  She declines any noted kidney stone passed, blood in the urine, dysuria, urinary frequency or urgency, or inability to pass urine.  She declines any changes in her bowel habits.  She declines any fever, chills, body aches, nausea, vomiting, or diarrhea.  Patient does note that she for the past 2 years has been feeling quite poorly, and working with her primary care provider to determine the possible cause.  She is quite concerned about her thyroid status, and is hopeful that her levothyroxine will be able to be increased based on the lab findings that were noted at her visit last Friday.  Outside of this, the only other notable concern is some new onset lesions on her upper extremities.  She notes at the beginning is almost small bruises on her arms, and slowly progressed into open sores, and then scab over and heal.  She declines that they are painful, itchy, or that they largely drained anything out of them.  She declines any swelling or streaking or spreading redness in the area.  She declines any recent travel or specific illness.  She declines spending time outside, or feeling as though they are largely related to bug bites.    Review of Systems  Constitutional, HEENT, cardiovascular, pulmonary, gi and gu systems are negative, except as otherwise noted.      Objective    /74 (BP Location: Left arm, Patient Position: Sitting, Cuff Size: Adult Regular)   Pulse 74   Temp 97.8  F (36.6  C) (Tympanic)   Resp 19   Ht 1.702 m (5' 7\")   Wt 100.5 kg (221 lb 9.6 oz)   LMP  (LMP Unknown)   SpO2 98%   Breastfeeding No   BMI 34.71 kg/m    Body mass index is 34.71 kg/m .  Physical Exam   GENERAL: alert and no distress  EYES: Eyes grossly normal to inspection, PERRL and " conjunctivae and sclerae normal  HENT: ear canals and TM's normal, nose and mouth without ulcers or lesions  RESP: lungs clear to auscultation - no rales, rhonchi or wheezes  CV: regular rate and rhythm, normal S1 S2, no S3 or S4, no murmur, click or rub, no peripheral edema  ABDOMEN: soft, nontender, no hepatosplenomegaly, no masses and bowel sounds normal  MS: no gross musculoskeletal defects noted, no edema  SKIN: Various lesions generalized across bilateral upper extremities in various stages of healing.  Violaceous macules in various areas, and others more excoriated open sores, or scabbed lesions.  Ranging from 1 mm to 6 mm in diameter  BACK: no CVA tenderness, no paralumbar tenderness    Results for orders placed or performed in visit on 12/10/24 (from the past 24 hours)   CBC with platelets and differential    Narrative    The following orders were created for panel order CBC with platelets and differential.  Procedure                               Abnormality         Status                     ---------                               -----------         ------                     CBC with platelets and d...[[id number]]                      Final result                 Please view results for these tests on the individual orders.   CBC with platelets and differential   Result Value Ref Range    WBC Count 5.8 4.0 - 11.0 10e3/uL    RBC Count 4.48 3.80 - 5.20 10e6/uL    Hemoglobin 12.8 11.7 - 15.7 g/dL    Hematocrit 40.1 35.0 - 47.0 %    MCV 90 78 - 100 fL    MCH 28.6 26.5 - 33.0 pg    MCHC 31.9 31.5 - 36.5 g/dL    RDW 13.0 10.0 - 15.0 %    Platelet Count 291 150 - 450 10e3/uL    % Neutrophils 71 %    % Lymphocytes 19 %    % Monocytes 7 %    % Eosinophils 2 %    % Basophils 1 %    % Immature Granulocytes 0 %    Absolute Neutrophils 4.1 1.6 - 8.3 10e3/uL    Absolute Lymphocytes 1.1 0.8 - 5.3 10e3/uL    Absolute Monocytes 0.4 0.0 - 1.3 10e3/uL    Absolute Eosinophils 0.1 0.0 - 0.7 10e3/uL    Absolute Basophils 0.0  0.0 - 0.2 10e3/uL    Absolute Immature Granulocytes 0.0 <=0.4 10e3/uL   UA Macroscopic with reflex to Microscopic and Culture - Lab Collect    Specimen: Urine, Clean Catch   Result Value Ref Range    Color Urine Yellow Colorless, Straw, Light Yellow, Yellow    Appearance Urine Clear Clear    Glucose Urine Negative Negative mg/dL    Bilirubin Urine Negative Negative    Ketones Urine Negative Negative mg/dL    Specific Gravity Urine >=1.030 1.005 - 1.030    Blood Urine Negative Negative    pH Urine 5.5 5.0 - 8.0    Protein Albumin Urine Negative Negative mg/dL    Urobilinogen Urine 0.2 0.2, 1.0 E.U./dL    Nitrite Urine Negative Negative    Leukocyte Esterase Urine Negative Negative    Narrative    Microscopic not indicated       Layne Pearce DNP FNP-C  Family Nurse Practitioner - Same Day Provider  [facility] [place]    Signed Electronically by: CATE Norman CNP

## 2024-12-14 ENCOUNTER — HEALTH MAINTENANCE LETTER (OUTPATIENT)
Age: 59
End: 2024-12-14

## 2024-12-16 ENCOUNTER — MYC MEDICAL ADVICE (OUTPATIENT)
Dept: INTERNAL MEDICINE | Facility: CLINIC | Age: 59
End: 2024-12-16
Payer: COMMERCIAL

## 2024-12-16 DIAGNOSIS — E03.9 ACQUIRED HYPOTHYROIDISM: Primary | ICD-10-CM

## 2024-12-17 PROBLEM — E03.9 ACQUIRED HYPOTHYROIDISM: Status: ACTIVE | Noted: 2024-12-17

## 2024-12-17 RX ORDER — LEVOTHYROXINE SODIUM 112 UG/1
112 TABLET ORAL DAILY
Qty: 90 TABLET | Refills: 3 | Status: SHIPPED | OUTPATIENT
Start: 2024-12-17

## 2025-01-06 ENCOUNTER — TELEPHONE (OUTPATIENT)
Dept: INTERNAL MEDICINE | Facility: CLINIC | Age: 60
End: 2025-01-06
Payer: COMMERCIAL

## 2025-01-06 ENCOUNTER — TELEPHONE (OUTPATIENT)
Dept: INTERNAL MEDICINE | Facility: CLINIC | Age: 60
End: 2025-01-06

## 2025-01-06 DIAGNOSIS — E03.9 ACQUIRED HYPOTHYROIDISM: Primary | ICD-10-CM

## 2025-01-06 DIAGNOSIS — N20.0 CALCULUS OF KIDNEY: ICD-10-CM

## 2025-01-06 DIAGNOSIS — R10.9 RIGHT FLANK PAIN: Primary | ICD-10-CM

## 2025-01-06 NOTE — TELEPHONE ENCOUNTER
I am sorry to hear that you are still having symptoms.  I am happy to place orders for imaging.  Considering that you had had this discomfort for this long, I do think that a CT of the kidneys, abdomen, and pelvis will be the best way to get a better idea of what might be causing your concerns.  I have placed this order. Here is the number for Imaging Services. They will help you with scheduling your exam - [phone].     Layne Pearce DNP FNP-C  Family Nurse Practitioner - Same Day Provider  [facility] [place]

## 2025-01-06 NOTE — TELEPHONE ENCOUNTER
Patient calling regarding her hypothyroidism. She reports her symptoms, despite medication, have not improved since she was diagnosed. Her main complaints are tiredness and lack of energy. She is requesting a referral to endocrinology without needing another clinic appointment if possible. Please advise.     Corby Booker RN

## 2025-01-06 NOTE — TELEPHONE ENCOUNTER
Patient reports she is continuing to have the same flank pain as when in office visit on 12/10. Patient states it was discussed that some imaging would be ordered after visit, however, she has not received a call to schedule. Patient is wanting to know if imaging was or could be ordered. Please advise     Corby Booker RN    Per OV note 12/10:  Considering history of kidney stones, educated patient that if lab workup today is nondescript, we could add on a renal ultrasound for further evaluation. Patient is open to next steps, but educated her that oftentimes kidney stone management revolves around pain management and expected passing of the stone.

## 2025-01-15 ENCOUNTER — HOSPITAL ENCOUNTER (OUTPATIENT)
Dept: CT IMAGING | Facility: HOSPITAL | Age: 60
Discharge: HOME | End: 2025-01-15
Payer: COMMERCIAL

## 2025-01-15 DIAGNOSIS — R10.9 RIGHT FLANK PAIN: ICD-10-CM

## 2025-01-15 DIAGNOSIS — N20.0 CALCULUS OF KIDNEY: ICD-10-CM

## 2025-01-15 PROCEDURE — 250N000009 HC RX 250

## 2025-01-15 PROCEDURE — 250N000011 HC RX IP 250 OP 636

## 2025-01-15 PROCEDURE — 74177 CT ABD & PELVIS W/CONTRAST: CPT

## 2025-01-15 RX ORDER — IOPAMIDOL 755 MG/ML
89 INJECTION, SOLUTION INTRAVASCULAR ONCE
Status: COMPLETED | OUTPATIENT
Start: 2025-01-15 | End: 2025-01-15

## 2025-01-15 RX ADMIN — IOPAMIDOL 89 ML: 755 INJECTION, SOLUTION INTRAVENOUS at 13:42

## 2025-01-15 RX ADMIN — SODIUM CHLORIDE 72 ML: 9 INJECTION, SOLUTION INTRAVENOUS at 13:43

## 2025-01-22 ENCOUNTER — OFFICE VISIT (OUTPATIENT)
Dept: CARDIOLOGY | Facility: CLINIC | Age: 60
End: 2025-01-22
Attending: NURSE PRACTITIONER
Payer: COMMERCIAL

## 2025-01-22 VITALS
HEART RATE: 68 BPM | HEIGHT: 68 IN | RESPIRATION RATE: 16 BRPM | DIASTOLIC BLOOD PRESSURE: 76 MMHG | SYSTOLIC BLOOD PRESSURE: 118 MMHG | BODY MASS INDEX: 34.86 KG/M2 | WEIGHT: 230 LBS

## 2025-01-22 DIAGNOSIS — E66.811 CLASS 1 OBESITY DUE TO EXCESS CALORIES WITH BODY MASS INDEX (BMI) OF 34.0 TO 34.9 IN ADULT, UNSPECIFIED WHETHER SERIOUS COMORBIDITY PRESENT: ICD-10-CM

## 2025-01-22 DIAGNOSIS — E78.00 HYPERCHOLESTEROLEMIA: ICD-10-CM

## 2025-01-22 DIAGNOSIS — I25.10 CORONARY ARTERY CALCIFICATION: Primary | ICD-10-CM

## 2025-01-22 DIAGNOSIS — E66.09 CLASS 1 OBESITY DUE TO EXCESS CALORIES WITH BODY MASS INDEX (BMI) OF 34.0 TO 34.9 IN ADULT, UNSPECIFIED WHETHER SERIOUS COMORBIDITY PRESENT: ICD-10-CM

## 2025-01-22 DIAGNOSIS — R06.09 DOE (DYSPNEA ON EXERTION): ICD-10-CM

## 2025-01-22 DIAGNOSIS — E03.9 ACQUIRED HYPOTHYROIDISM: ICD-10-CM

## 2025-01-22 LAB
CHOLEST SERPL-MCNC: 166 MG/DL
FASTING STATUS PATIENT QL REPORTED: YES
HDLC SERPL-MCNC: 56 MG/DL
LDLC SERPL CALC-MCNC: 100 MG/DL
NONHDLC SERPL-MCNC: 110 MG/DL
TRIGL SERPL-MCNC: 50 MG/DL

## 2025-01-22 PROCEDURE — 80061 LIPID PANEL: CPT | Performed by: INTERNAL MEDICINE

## 2025-01-22 PROCEDURE — 99204 OFFICE O/P NEW MOD 45 MIN: CPT | Performed by: INTERNAL MEDICINE

## 2025-01-22 PROCEDURE — 36415 COLL VENOUS BLD VENIPUNCTURE: CPT | Performed by: INTERNAL MEDICINE

## 2025-01-22 RX ORDER — ROSUVASTATIN CALCIUM 20 MG/1
20 TABLET, COATED ORAL DAILY
Qty: 34 TABLET | Refills: 3 | Status: SHIPPED | OUTPATIENT
Start: 2025-01-22

## 2025-01-22 NOTE — LETTER
1/22/2025    Cameron Barron MD  [address] [place] [address]    RE: Kenia Dumont       Dear Colleague,     I had the pleasure of seeing Kenia Dumont in the [facility].      Thank you, Dr. Cameron Barron, for asking the [facility] team to see Ms. Kenia Dumont to evaluate coronary artery calcification.    Assessment/Recommendations   Assessment:    1.  Coronary artery calcification, recently identified on abdominal CT done to assess kidney stones.  As I told the patient, this indicates the presence of coronary artery disease but does not tell us severity.  She does have symptoms of exertional dyspnea and decline in exercise capacity and I have suggested that we pursue nuclear stress test imaging.  If this is abnormal, we will then pursue angiography to define anatomy.  2.  Hypercholesterolemia, currently treated with atorvastatin 20 mg daily.  Her last lipid profile a year ago showed an LDL of 89.  Suggested we repeat a lipid profile today.  If it is still in this range, I will have her double up her atorvastatin to 40 mg daily in an effort to get her LDL down below 70 and ideally closer to 55.  3.  ADHD, on Adderall although tells me she does not take it every day.  4.  Hypothyroidism, on Synthroid    Plan:  1.  Continue current medications for now  2.  Check lipid profile with further recommendations to follow  3.  Set up nuclear stress test       History of Present Illness    Ms. Kenia Dumont is a 59 year old female with history of hypercholesterolemia, hypothyroidism who recently began to experience symptoms of right flank pain.  She subsequently underwent a CT of the abdomen which incidentally showed significant calcification in the left anterior descending and right coronary arteries.  This prompts her presentation here.  She does report a decline in exercise capacity over the last 6 months as well as symptoms of exertional dyspnea  "which have been present for 4 years now but seem to be worsening.  She does admit to 40 pound weight gain during this time.  Not sure how much of that is due to weight gain or something more going on.  No clear chest discomfort although she does have significant generalized body aches and joint aches.  No prior history of documented CATHY.    ECG (personally reviewed): No ECG today    Cardiac Imaging Studies (personally reviewed): No recent cardiac imaging     Physical Examination Review of Systems   /76 (BP Location: Left arm, Patient Position: Sitting, Cuff Size: Adult Large)   Pulse 68   Resp 16   Ht 1.715 m (5' 7.5\")   Wt 104.3 kg (230 lb)   LMP  (LMP Unknown)   BMI 35.49 kg/m    Body mass index is 35.49 kg/m .  Wt Readings from Last 3 Encounters:   01/22/25 104.3 kg (230 lb)   12/10/24 100.5 kg (221 lb 9.6 oz)   08/01/24 100.2 kg (221 lb)     General Appearance:   Awake, Alert, No acute distress.   HEENT:  No scleral icterus; the mucous membranes were pink and moist.   Neck: No cervical bruits or jugular venous distention    Chest: The spine was straight. The chest was symmetric.   Lungs:   Respirations unlabored; the lungs are clear to auscultation. No wheezing   Cardiovascular:   Regular rate and rhythm.  S1, S2 normal.  No murmur or gallop   Abdomen:  No organomegaly, masses, bruits, or tenderness. Bowels sounds are present   Extremities: No peripheral edema bilaterally   Skin: No xanthelasma. Warm, Dry.   Musculoskeletal: No tenderness.   Neurologic: Mood and affect are appropriate.    Encounter Vitals  BP: 118/76  Pulse: 68  Resp: 16  Weight: 104.3 kg (230 lb)  Height: 171.5 cm (5' 7.5\")                                         Medical History  Surgical History Family History Social History   Past Medical History:   Diagnosis Date     ADHD (attention deficit hyperactivity disorder)      Anxiety      Coronary artery disease     coronary calcification on chest CT     Hyperlipidemia      " Hypothyroidism      Kidney stone      Obesity     Past Surgical History:   Procedure Laterality Date     COMBINED CYSTOSCOPY, INSERT STENT URETER(S) Right 6/23/2020    Procedure: CYSTOSCOPY, WITH FLEXIBLE URETEROSCOPIC CALCULUS REMOVAL LASER LITHOTRIPSY  AND STENT INSERTION RIGHT;  Surgeon: Washington Moses MD;  Location: [facility];  Service: Urology     OVARY SURGERY      Family History   Problem Relation Age of Onset     Alcoholism Mother      Cirrhosis Mother      Alcoholism Father     Social History     Socioeconomic History     Marital status:      Spouse name: Not on file     Number of children: Not on file     Years of education: Not on file     Highest education level: Not on file   Occupational History     Not on file   Tobacco Use     Smoking status: Former     Current packs/day: 0.00     Types: Cigarettes     Quit date: 2022     Years since quitting: 3.0     Smokeless tobacco: Never     Tobacco comments:     4-5 cigarettes per day     Quit 2022   Substance and Sexual Activity     Alcohol use: Not Currently     Drug use: No     Sexual activity: Not Currently   Other Topics Concern     Parent/sibling w/ CABG, MI or angioplasty before 65F 55M? No   Social History Narrative     Not on file     Social Drivers of Health     Financial Resource Strain: High Risk (8/1/2024)    Financial Resource Strain      Within the past 12 months, have you or your family members you live with been unable to get utilities (heat, electricity) when it was really needed?: Yes   Food Insecurity: Low Risk  (8/1/2024)    Food Insecurity      Within the past 12 months, did you worry that your food would run out before you got money to buy more?: No      Within the past 12 months, did the food you bought just not last and you didn t have money to get more?: No   Transportation Needs: Low Risk  (8/1/2024)    Transportation Needs      Within the past 12 months, has lack of transportation kept you from medical  appointments, getting your medicines, non-medical meetings or appointments, work, or from getting things that you need?: No   Physical Activity: Not on file   Stress: Not on file   Social Connections: Not on file   Interpersonal Safety: Low Risk  (8/1/2024)    Interpersonal Safety      Do you feel physically and emotionally safe where you currently live?: Yes      Within the past 12 months, have you been hit, slapped, kicked or otherwise physically hurt by someone?: No      Within the past 12 months, have you been humiliated or emotionally abused in other ways by your partner or ex-partner?: No   Housing Stability: Low Risk  (8/1/2024)    Housing Stability      Do you have housing? : Yes      Are you worried about losing your housing?: No          Medications  Allergies   Current Outpatient Medications   Medication Sig Dispense Refill     amphetamine-dextroamphetamine (ADDERALL) 20 MG tablet Take 1 tablet (20 mg) by mouth as needed (In the morning as needed). 30 tablet 0     atorvastatin (LIPITOR) 20 MG tablet [ATORVASTATIN (LIPITOR) 20 MG TABLET] Take 20 mg by mouth at bedtime.       FLUoxetine (PROZAC) 40 MG capsule Take 1 capsule (40 mg) by mouth daily. 90 capsule 1     ibuprofen (ADVIL/MOTRIN) 600 MG tablet Take 800 mg by mouth as needed for moderate pain. Takes it rarely. She takes 4 of the 200 mg tablet       Lactobacillus acidoph-pectin (ACIDOPHILUS-PECTIN) 75 million cell -100 mg cap capsule [LACTOBACILLUS ACIDOPH-PECTIN (ACIDOPHILUS-PECTIN) 75 MILLION CELL -100 MG CAP CAPSULE] Take 1 capsule by mouth at bedtime.       levothyroxine (SYNTHROID/LEVOTHROID) 112 MCG tablet Take 1 tablet (112 mcg) by mouth daily. 90 tablet 3     naproxen (NAPROSYN) 500 MG tablet Take 1 tablet (500 mg) by mouth 2 times daily (with meals). 15 tablet 0     traZODone (DESYREL) 100 MG tablet Take 1 tablet (100 mg) by mouth at bedtime. 90 tablet 1     Vitamin D, Cholecalciferol, 25 MCG (1000 UT) CAPS Take 2,000 Units by mouth daily         Allergies   Allergen Reactions     Tetracyclines & Related Unknown     Zyban [Bupropion] Unknown         Lab Results    Chemistry/lipid CBC Cardiac Enzymes/BNP/TSH/INR   Recent Labs   Lab Test 12/10/24  1137 12/06/24  0916 02/23/24  1433   TRIG  --   --  102   LDL  --   --  89   BUN 17.6   < > 14.5      < > 142   CO2 26   < > 24    < > = values in this interval not displayed.    Recent Labs   Lab Test 12/10/24  1137   WBC 5.8   HGB 12.8   HCT 40.1   MCV 90       Recent Labs   Lab Test 12/06/24  0916   TSH 5.72*        A total of 53 minutes was spent reviewing patient's medical records, obtaining history and performing examination, as well as discussing diagnoses/ recommendations with patient and answering all questions.                        Thank you for allowing me to participate in the care of your patient.      Sincerely,     Yuni Ceja MD     [facility]  cc:   Garima Cook, CATE CNP  [address] [place] [address]

## 2025-01-22 NOTE — PATIENT INSTRUCTIONS
Continue current medications for now.  Repeat lipids today to see if we need to increase your atorvastatin  Set up nuclear stress test with further recommendations to follow

## 2025-01-22 NOTE — PROGRESS NOTES
Thank you, Dr. Cameron Barron, for asking the [facility] team to see Ms. Kenia Dumont to evaluate coronary artery calcification.    Assessment/Recommendations   Assessment:    1.  Coronary artery calcification, recently identified on abdominal CT done to assess kidney stones.  As I told the patient, this indicates the presence of coronary artery disease but does not tell us severity.  She does have symptoms of exertional dyspnea and decline in exercise capacity and I have suggested that we pursue nuclear stress test imaging.  If this is abnormal, we will then pursue angiography to define anatomy.  2.  Hypercholesterolemia, currently treated with atorvastatin 20 mg daily.  Her last lipid profile a year ago showed an LDL of 89.  Suggested we repeat a lipid profile today.  If it is still in this range, I will have her double up her atorvastatin to 40 mg daily in an effort to get her LDL down below 70 and ideally closer to 55.  3.  ADHD, on Adderall although tells me she does not take it every day.  4.  Hypothyroidism, on Synthroid    Plan:  1.  Continue current medications for now  2.  Check lipid profile with further recommendations to follow  3.  Set up nuclear stress test       History of Present Illness    Ms. Kenia Dumont is a 59 year old female with history of hypercholesterolemia, hypothyroidism who recently began to experience symptoms of right flank pain.  She subsequently underwent a CT of the abdomen which incidentally showed significant calcification in the left anterior descending and right coronary arteries.  This prompts her presentation here.  She does report a decline in exercise capacity over the last 6 months as well as symptoms of exertional dyspnea which have been present for 4 years now but seem to be worsening.  She does admit to 40 pound weight gain during this time.  Not sure how much of that is due to weight gain or something more going on.  No clear chest  "discomfort although she does have significant generalized body aches and joint aches.  No prior history of documented CATHY.    ECG (personally reviewed): No ECG today    Cardiac Imaging Studies (personally reviewed): No recent cardiac imaging     Physical Examination Review of Systems   /76 (BP Location: Left arm, Patient Position: Sitting, Cuff Size: Adult Large)   Pulse 68   Resp 16   Ht 1.715 m (5' 7.5\")   Wt 104.3 kg (230 lb)   LMP  (LMP Unknown)   BMI 35.49 kg/m    Body mass index is 35.49 kg/m .  Wt Readings from Last 3 Encounters:   01/22/25 104.3 kg (230 lb)   12/10/24 100.5 kg (221 lb 9.6 oz)   08/01/24 100.2 kg (221 lb)     General Appearance:   Awake, Alert, No acute distress.   HEENT:  No scleral icterus; the mucous membranes were pink and moist.   Neck: No cervical bruits or jugular venous distention    Chest: The spine was straight. The chest was symmetric.   Lungs:   Respirations unlabored; the lungs are clear to auscultation. No wheezing   Cardiovascular:   Regular rate and rhythm.  S1, S2 normal.  No murmur or gallop   Abdomen:  No organomegaly, masses, bruits, or tenderness. Bowels sounds are present   Extremities: No peripheral edema bilaterally   Skin: No xanthelasma. Warm, Dry.   Musculoskeletal: No tenderness.   Neurologic: Mood and affect are appropriate.    Encounter Vitals  BP: 118/76  Pulse: 68  Resp: 16  Weight: 104.3 kg (230 lb)  Height: 171.5 cm (5' 7.5\")                                         Medical History  Surgical History Family History Social History   Past Medical History:   Diagnosis Date    ADHD (attention deficit hyperactivity disorder)     Anxiety     Coronary artery disease     coronary calcification on chest CT    Hyperlipidemia     Hypothyroidism     Kidney stone     Obesity     Past Surgical History:   Procedure Laterality Date    COMBINED CYSTOSCOPY, INSERT STENT URETER(S) Right 6/23/2020    Procedure: CYSTOSCOPY, WITH FLEXIBLE URETEROSCOPIC CALCULUS REMOVAL " LASER LITHOTRIPSY  AND STENT INSERTION RIGHT;  Surgeon: Washington Moses MD;  Location: [facility];  Service: Urology    OVARY SURGERY      Family History   Problem Relation Age of Onset    Alcoholism Mother     Cirrhosis Mother     Alcoholism Father     Social History     Socioeconomic History    Marital status:      Spouse name: Not on file    Number of children: Not on file    Years of education: Not on file    Highest education level: Not on file   Occupational History    Not on file   Tobacco Use    Smoking status: Former     Current packs/day: 0.00     Types: Cigarettes     Quit date: 2022     Years since quitting: 3.0    Smokeless tobacco: Never    Tobacco comments:     4-5 cigarettes per day     Quit 2022   Substance and Sexual Activity    Alcohol use: Not Currently    Drug use: No    Sexual activity: Not Currently   Other Topics Concern    Parent/sibling w/ CABG, MI or angioplasty before 65F 55M? No   Social History Narrative    Not on file     Social Drivers of Health     Financial Resource Strain: High Risk (8/1/2024)    Financial Resource Strain     Within the past 12 months, have you or your family members you live with been unable to get utilities (heat, electricity) when it was really needed?: Yes   Food Insecurity: Low Risk  (8/1/2024)    Food Insecurity     Within the past 12 months, did you worry that your food would run out before you got money to buy more?: No     Within the past 12 months, did the food you bought just not last and you didn t have money to get more?: No   Transportation Needs: Low Risk  (8/1/2024)    Transportation Needs     Within the past 12 months, has lack of transportation kept you from medical appointments, getting your medicines, non-medical meetings or appointments, work, or from getting things that you need?: No   Physical Activity: Not on file   Stress: Not on file   Social Connections: Not on file   Interpersonal Safety: Low Risk  (8/1/2024)     Interpersonal Safety     Do you feel physically and emotionally safe where you currently live?: Yes     Within the past 12 months, have you been hit, slapped, kicked or otherwise physically hurt by someone?: No     Within the past 12 months, have you been humiliated or emotionally abused in other ways by your partner or ex-partner?: No   Housing Stability: Low Risk  (8/1/2024)    Housing Stability     Do you have housing? : Yes     Are you worried about losing your housing?: No          Medications  Allergies   Current Outpatient Medications   Medication Sig Dispense Refill    amphetamine-dextroamphetamine (ADDERALL) 20 MG tablet Take 1 tablet (20 mg) by mouth as needed (In the morning as needed). 30 tablet 0    atorvastatin (LIPITOR) 20 MG tablet [ATORVASTATIN (LIPITOR) 20 MG TABLET] Take 20 mg by mouth at bedtime.      FLUoxetine (PROZAC) 40 MG capsule Take 1 capsule (40 mg) by mouth daily. 90 capsule 1    ibuprofen (ADVIL/MOTRIN) 600 MG tablet Take 800 mg by mouth as needed for moderate pain. Takes it rarely. She takes 4 of the 200 mg tablet      Lactobacillus acidoph-pectin (ACIDOPHILUS-PECTIN) 75 million cell -100 mg cap capsule [LACTOBACILLUS ACIDOPH-PECTIN (ACIDOPHILUS-PECTIN) 75 MILLION CELL -100 MG CAP CAPSULE] Take 1 capsule by mouth at bedtime.      levothyroxine (SYNTHROID/LEVOTHROID) 112 MCG tablet Take 1 tablet (112 mcg) by mouth daily. 90 tablet 3    naproxen (NAPROSYN) 500 MG tablet Take 1 tablet (500 mg) by mouth 2 times daily (with meals). 15 tablet 0    traZODone (DESYREL) 100 MG tablet Take 1 tablet (100 mg) by mouth at bedtime. 90 tablet 1    Vitamin D, Cholecalciferol, 25 MCG (1000 UT) CAPS Take 2,000 Units by mouth daily        Allergies   Allergen Reactions    Tetracyclines & Related Unknown    Zyban [Bupropion] Unknown         Lab Results    Chemistry/lipid CBC Cardiac Enzymes/BNP/TSH/INR   Recent Labs   Lab Test 12/10/24  1137 12/06/24  0916 02/23/24  1433   TRIG  --   --  102   LDL  --    --  89   BUN 17.6   < > 14.5      < > 142   CO2 26   < > 24    < > = values in this interval not displayed.    Recent Labs   Lab Test 12/10/24  1137   WBC 5.8   HGB 12.8   HCT 40.1   MCV 90       Recent Labs   Lab Test 12/06/24  0916   TSH 5.72*        A total of 53 minutes was spent reviewing patient's medical records, obtaining history and performing examination, as well as discussing diagnoses/ recommendations with patient and answering all questions.

## 2025-01-22 NOTE — H&P (VIEW-ONLY)
Thank you, Dr. Cameron Barron, for asking the [facility] team to see Ms. Kenia Dumont to evaluate coronary artery calcification.    Assessment/Recommendations   Assessment:    1.  Coronary artery calcification, recently identified on abdominal CT done to assess kidney stones.  As I told the patient, this indicates the presence of coronary artery disease but does not tell us severity.  She does have symptoms of exertional dyspnea and decline in exercise capacity and I have suggested that we pursue nuclear stress test imaging.  If this is abnormal, we will then pursue angiography to define anatomy.  2.  Hypercholesterolemia, currently treated with atorvastatin 20 mg daily.  Her last lipid profile a year ago showed an LDL of 89.  Suggested we repeat a lipid profile today.  If it is still in this range, I will have her double up her atorvastatin to 40 mg daily in an effort to get her LDL down below 70 and ideally closer to 55.  3.  ADHD, on Adderall although tells me she does not take it every day.  4.  Hypothyroidism, on Synthroid    Plan:  1.  Continue current medications for now  2.  Check lipid profile with further recommendations to follow  3.  Set up nuclear stress test       History of Present Illness    Ms. Kenia Dumont is a 59 year old female with history of hypercholesterolemia, hypothyroidism who recently began to experience symptoms of right flank pain.  She subsequently underwent a CT of the abdomen which incidentally showed significant calcification in the left anterior descending and right coronary arteries.  This prompts her presentation here.  She does report a decline in exercise capacity over the last 6 months as well as symptoms of exertional dyspnea which have been present for 4 years now but seem to be worsening.  She does admit to 40 pound weight gain during this time.  Not sure how much of that is due to weight gain or something more going on.  No clear chest  "discomfort although she does have significant generalized body aches and joint aches.  No prior history of documented CATHY.    ECG (personally reviewed): No ECG today    Cardiac Imaging Studies (personally reviewed): No recent cardiac imaging     Physical Examination Review of Systems   /76 (BP Location: Left arm, Patient Position: Sitting, Cuff Size: Adult Large)   Pulse 68   Resp 16   Ht 1.715 m (5' 7.5\")   Wt 104.3 kg (230 lb)   LMP  (LMP Unknown)   BMI 35.49 kg/m    Body mass index is 35.49 kg/m .  Wt Readings from Last 3 Encounters:   01/22/25 104.3 kg (230 lb)   12/10/24 100.5 kg (221 lb 9.6 oz)   08/01/24 100.2 kg (221 lb)     General Appearance:   Awake, Alert, No acute distress.   HEENT:  No scleral icterus; the mucous membranes were pink and moist.   Neck: No cervical bruits or jugular venous distention    Chest: The spine was straight. The chest was symmetric.   Lungs:   Respirations unlabored; the lungs are clear to auscultation. No wheezing   Cardiovascular:   Regular rate and rhythm.  S1, S2 normal.  No murmur or gallop   Abdomen:  No organomegaly, masses, bruits, or tenderness. Bowels sounds are present   Extremities: No peripheral edema bilaterally   Skin: No xanthelasma. Warm, Dry.   Musculoskeletal: No tenderness.   Neurologic: Mood and affect are appropriate.    Encounter Vitals  BP: 118/76  Pulse: 68  Resp: 16  Weight: 104.3 kg (230 lb)  Height: 171.5 cm (5' 7.5\")                                         Medical History  Surgical History Family History Social History   Past Medical History:   Diagnosis Date    ADHD (attention deficit hyperactivity disorder)     Anxiety     Coronary artery disease     coronary calcification on chest CT    Hyperlipidemia     Hypothyroidism     Kidney stone     Obesity     Past Surgical History:   Procedure Laterality Date    COMBINED CYSTOSCOPY, INSERT STENT URETER(S) Right 6/23/2020    Procedure: CYSTOSCOPY, WITH FLEXIBLE URETEROSCOPIC CALCULUS REMOVAL " LASER LITHOTRIPSY  AND STENT INSERTION RIGHT;  Surgeon: Washington Moses MD;  Location: [facility];  Service: Urology    OVARY SURGERY      Family History   Problem Relation Age of Onset    Alcoholism Mother     Cirrhosis Mother     Alcoholism Father     Social History     Socioeconomic History    Marital status:      Spouse name: Not on file    Number of children: Not on file    Years of education: Not on file    Highest education level: Not on file   Occupational History    Not on file   Tobacco Use    Smoking status: Former     Current packs/day: 0.00     Types: Cigarettes     Quit date: 2022     Years since quitting: 3.0    Smokeless tobacco: Never    Tobacco comments:     4-5 cigarettes per day     Quit 2022   Substance and Sexual Activity    Alcohol use: Not Currently    Drug use: No    Sexual activity: Not Currently   Other Topics Concern    Parent/sibling w/ CABG, MI or angioplasty before 65F 55M? No   Social History Narrative    Not on file     Social Drivers of Health     Financial Resource Strain: High Risk (8/1/2024)    Financial Resource Strain     Within the past 12 months, have you or your family members you live with been unable to get utilities (heat, electricity) when it was really needed?: Yes   Food Insecurity: Low Risk  (8/1/2024)    Food Insecurity     Within the past 12 months, did you worry that your food would run out before you got money to buy more?: No     Within the past 12 months, did the food you bought just not last and you didn t have money to get more?: No   Transportation Needs: Low Risk  (8/1/2024)    Transportation Needs     Within the past 12 months, has lack of transportation kept you from medical appointments, getting your medicines, non-medical meetings or appointments, work, or from getting things that you need?: No   Physical Activity: Not on file   Stress: Not on file   Social Connections: Not on file   Interpersonal Safety: Low Risk  (8/1/2024)     Interpersonal Safety     Do you feel physically and emotionally safe where you currently live?: Yes     Within the past 12 months, have you been hit, slapped, kicked or otherwise physically hurt by someone?: No     Within the past 12 months, have you been humiliated or emotionally abused in other ways by your partner or ex-partner?: No   Housing Stability: Low Risk  (8/1/2024)    Housing Stability     Do you have housing? : Yes     Are you worried about losing your housing?: No          Medications  Allergies   Current Outpatient Medications   Medication Sig Dispense Refill    amphetamine-dextroamphetamine (ADDERALL) 20 MG tablet Take 1 tablet (20 mg) by mouth as needed (In the morning as needed). 30 tablet 0    atorvastatin (LIPITOR) 20 MG tablet [ATORVASTATIN (LIPITOR) 20 MG TABLET] Take 20 mg by mouth at bedtime.      FLUoxetine (PROZAC) 40 MG capsule Take 1 capsule (40 mg) by mouth daily. 90 capsule 1    ibuprofen (ADVIL/MOTRIN) 600 MG tablet Take 800 mg by mouth as needed for moderate pain. Takes it rarely. She takes 4 of the 200 mg tablet      Lactobacillus acidoph-pectin (ACIDOPHILUS-PECTIN) 75 million cell -100 mg cap capsule [LACTOBACILLUS ACIDOPH-PECTIN (ACIDOPHILUS-PECTIN) 75 MILLION CELL -100 MG CAP CAPSULE] Take 1 capsule by mouth at bedtime.      levothyroxine (SYNTHROID/LEVOTHROID) 112 MCG tablet Take 1 tablet (112 mcg) by mouth daily. 90 tablet 3    naproxen (NAPROSYN) 500 MG tablet Take 1 tablet (500 mg) by mouth 2 times daily (with meals). 15 tablet 0    traZODone (DESYREL) 100 MG tablet Take 1 tablet (100 mg) by mouth at bedtime. 90 tablet 1    Vitamin D, Cholecalciferol, 25 MCG (1000 UT) CAPS Take 2,000 Units by mouth daily        Allergies   Allergen Reactions    Tetracyclines & Related Unknown    Zyban [Bupropion] Unknown         Lab Results    Chemistry/lipid CBC Cardiac Enzymes/BNP/TSH/INR   Recent Labs   Lab Test 12/10/24  1137 12/06/24  0916 02/23/24  1433   TRIG  --   --  102   LDL  --    --  89   BUN 17.6   < > 14.5      < > 142   CO2 26   < > 24    < > = values in this interval not displayed.    Recent Labs   Lab Test 12/10/24  1137   WBC 5.8   HGB 12.8   HCT 40.1   MCV 90       Recent Labs   Lab Test 12/06/24  0916   TSH 5.72*        A total of 53 minutes was spent reviewing patient's medical records, obtaining history and performing examination, as well as discussing diagnoses/ recommendations with patient and answering all questions.

## 2025-01-23 DIAGNOSIS — E78.00 HYPERCHOLESTEROLEMIA: ICD-10-CM

## 2025-01-23 DIAGNOSIS — I25.10 CORONARY ARTERY CALCIFICATION: Primary | ICD-10-CM

## 2025-01-28 ENCOUNTER — TELEPHONE (OUTPATIENT)
Dept: CARDIOLOGY | Facility: CLINIC | Age: 60
End: 2025-01-28
Payer: COMMERCIAL

## 2025-01-28 NOTE — TELEPHONE ENCOUNTER
[facility] Call Center    Phone Message    May a detailed message be left on voicemail: yes     Reason for Call: Form or Letter   Type or form/letter needing completion: pre-authorization form for [place] stress test on 1/30  Provider: Dr. Ceja  Date form needed: ASAP  Once completed: Please send to [employer]        Action Taken: Other: cardio    Travel Screening: Not Applicable     Date of Service:

## 2025-01-28 NOTE — TELEPHONE ENCOUNTER
Return call to patient who stated she had contacted her insurance and was informed that PA was needed for 1-30-25 nucGXT.    Explained to patient that per chart review, PA team has been working on determining insurance coverage / approval for nucGXT - recommended she contact her insurance tomorrow to inquire if PA completed, if not, stress test could be rescheduled to later date until process completed - patient verbalized understanding and agreed with plan.  mg

## 2025-01-29 ENCOUNTER — VIRTUAL VISIT (OUTPATIENT)
Dept: UROLOGY | Facility: CLINIC | Age: 60
End: 2025-01-29
Payer: COMMERCIAL

## 2025-01-29 VITALS — WEIGHT: 225 LBS | HEIGHT: 68 IN | BODY MASS INDEX: 34.1 KG/M2

## 2025-01-29 DIAGNOSIS — N20.0 CALCULUS OF RIGHT KIDNEY: Primary | ICD-10-CM

## 2025-01-29 ASSESSMENT — PAIN SCALES - GENERAL: PAINLEVEL_OUTOF10: MILD PAIN (2)

## 2025-01-29 NOTE — NURSING NOTE
Current patient location: [address] [place] [address] [address]  [place] [address]    Is the patient currently in the state of [place]? YES    Visit mode: VIDEO    If the visit is dropped, the patient can be reconnected by:VIDEO VISIT: Text to cell phone:   Telephone Information:   Mobile [phone]       Will anyone else be joining the visit? NO  (If patient encounters technical issues they should call [phone])    Are changes needed to the allergy or medication list? No    Are refills needed on medications prescribed by this physician? NO    Rooming Documentation:  Unable to complete questionnaire(s) due to time    Reason for visit: Consult    Hawa MCCURDY

## 2025-01-29 NOTE — PROGRESS NOTES
Urology Video Office Visit    Video-Visit Details    Type of service:  Video Visit    Video Start Time: 0930    Video End Time: 1000    Originating Location (pt. Location): Home    Distant Location (provider location):  On-site     Platform used for Video Visit: SeroMatch           Assessment and Plan:     Assessment:59 year old female with a right nonobstructing 2mm renal stone.     Plan:  -Reviewed CT scan with patient. Noted nonobstructing right 2mm renal stone.   -The patient and I discussed the diagnosis and natural history of urolithiasis.   -We discussed the nature of nonobstructing renal stones and pain.  We reviewed the fact that widely held opinion in the field of urology is that nonobstructing stones should not cause pain; however, several publications have demonstrated improvement with stone removal in such circumstances. We discussed that stone removal for pain in the absence of obstruction is not a guarantee to improve pain and that the potential for pain improvement is likely around 50%.  Recommend for further evaluation with Dr. Caldwell, Dr. Kay, Dr. Wolf, or Dr. Jones. Pt amenable to plan.   -If having severe flank pain, fevers, chills, nausea, or vomiting please notify Urology clinic or be seen in the ER.       Maria D Ornelas CNP  Department of Urology  January 29, 2025    I spent a total of 30 minutes spent on the date of the encounter doing chart review, history and exam, documentation, and further activities as noted above.          Chief Complaint:   Right Renal Stone and right flank pain         History of Present Illness:    Kenia Dumont is a pleasant 59 year old female who presents with concerns of right flank pain and a right renal stone.     Ms. Dumont notes right flank pain for the last 3 months. She notes that pain can interfere with her ADL's. Noted increase pain with twisting and bending. She has had to sleep in a recliner occasional due to pain. Pain will affect  activity such as walking.     Denies any f/c/n/v, gross hematuria, or dysuria.     CT scan on 1/15/25 (images personally reviewed) revealed a right 2mm nonobstructing renal stone without hydronephrosis. No noted left hydronephrosis or nephrolithiasis.     Last stone episode was in June which did require a right URS/LL on 6/23/20 with Dr. Moses.     Stone Analysis: CaOx    No known family history of nephrolithiasis.     Stone Risk Factors: None         Past Medical History:     Past Medical History:   Diagnosis Date    ADHD (attention deficit hyperactivity disorder)     Anxiety     Coronary artery disease     coronary calcification on chest CT    Hyperlipidemia     Hypothyroidism     Kidney stone     Obesity             Past Surgical History:     Past Surgical History:   Procedure Laterality Date    COMBINED CYSTOSCOPY, INSERT STENT URETER(S) Right 6/23/2020    Procedure: CYSTOSCOPY, WITH FLEXIBLE URETEROSCOPIC CALCULUS REMOVAL LASER LITHOTRIPSY  AND STENT INSERTION RIGHT;  Surgeon: Washington Moses MD;  Location: [facility];  Service: Urology    OVARY SURGERY              Medications     Current Outpatient Medications   Medication Sig Dispense Refill    amphetamine-dextroamphetamine (ADDERALL) 20 MG tablet Take 1 tablet (20 mg) by mouth as needed (In the morning as needed). 30 tablet 0    FLUoxetine (PROZAC) 40 MG capsule Take 1 capsule (40 mg) by mouth daily. 90 capsule 1    ibuprofen (ADVIL/MOTRIN) 600 MG tablet Take 800 mg by mouth as needed for moderate pain. Takes it rarely. She takes 4 of the 200 mg tablet      Lactobacillus acidoph-pectin (ACIDOPHILUS-PECTIN) 75 million cell -100 mg cap capsule [LACTOBACILLUS ACIDOPH-PECTIN (ACIDOPHILUS-PECTIN) 75 MILLION CELL -100 MG CAP CAPSULE] Take 1 capsule by mouth at bedtime.      levothyroxine (SYNTHROID/LEVOTHROID) 112 MCG tablet Take 1 tablet (112 mcg) by mouth daily. 90 tablet 3    naproxen (NAPROSYN) 500 MG tablet Take 1 tablet (500 mg) by mouth 2  times daily (with meals). 15 tablet 0    rosuvastatin (CRESTOR) 20 MG tablet Take 1 tablet (20 mg) by mouth daily. 34 tablet 3    traZODone (DESYREL) 100 MG tablet Take 1 tablet (100 mg) by mouth at bedtime. 90 tablet 1    Vitamin D, Cholecalciferol, 25 MCG (1000 UT) CAPS Take 2,000 Units by mouth daily       No current facility-administered medications for this visit.            Family History:     Family History   Problem Relation Age of Onset    Alcoholism Mother     Cirrhosis Mother     Alcoholism Father             Social History:     Social History     Socioeconomic History    Marital status:      Spouse name: Not on file    Number of children: Not on file    Years of education: Not on file    Highest education level: Not on file   Occupational History    Not on file   Tobacco Use    Smoking status: Former     Current packs/day: 0.00     Types: Cigarettes     Quit date: 2022     Years since quitting: 3.0    Smokeless tobacco: Never    Tobacco comments:     4-5 cigarettes per day     Quit 2022   Substance and Sexual Activity    Alcohol use: Not Currently    Drug use: No    Sexual activity: Not Currently   Other Topics Concern    Parent/sibling w/ CABG, MI or angioplasty before 65F 55M? No   Social History Narrative    Not on file     Social Drivers of Health     Financial Resource Strain: High Risk (8/1/2024)    Financial Resource Strain     Within the past 12 months, have you or your family members you live with been unable to get utilities (heat, electricity) when it was really needed?: Yes   Food Insecurity: Low Risk  (8/1/2024)    Food Insecurity     Within the past 12 months, did you worry that your food would run out before you got money to buy more?: No     Within the past 12 months, did the food you bought just not last and you didn t have money to get more?: No   Transportation Needs: Low Risk  (8/1/2024)    Transportation Needs     Within the past 12 months, has lack of transportation kept you  from medical appointments, getting your medicines, non-medical meetings or appointments, work, or from getting things that you need?: No   Physical Activity: Not on file   Stress: Not on file   Social Connections: Not on file   Interpersonal Safety: Low Risk  (8/1/2024)    Interpersonal Safety     Do you feel physically and emotionally safe where you currently live?: Yes     Within the past 12 months, have you been hit, slapped, kicked or otherwise physically hurt by someone?: No     Within the past 12 months, have you been humiliated or emotionally abused in other ways by your partner or ex-partner?: No   Housing Stability: Low Risk  (8/1/2024)    Housing Stability     Do you have housing? : Yes     Are you worried about losing your housing?: No            Allergies:   Tetracyclines & related and Zyban [bupropion]         Review of Systems:  From intake questionnaire   Negative 14 system review except as noted on HPI, nurse's note.         Physical Exam:   General Appearance: Well groomed, hygenic  Eyes: No redness, discharge  Respiratory: No cough, no respiratory distress or labored breathing  Musculoskeletal: Grossly normal, full range of motion in upper extremities, no gross deficits  Skin: No discoloration or apparent rashes  Neurologic - No tremors  Psychiatric - Alert and oriented  The rest of a comprehensive physical examination is deferred due to video visit restrictions        Labs:    I personally reviewed all applicable laboratory data and went over findings with patient  Significant for:    CBC RESULTS:  Recent Labs   Lab Test 12/10/24  1137 08/01/24  1203 07/02/20  0845 06/21/20  0930   WBC 5.8 5.0 11.7* 11.0   HGB 12.8 14.1 12.8 14.1    278 345 349        BMP RESULTS:  Recent Labs   Lab Test 12/10/24  1137 12/06/24  0916 02/23/24  1433 09/18/23  1400 07/29/22  1028 04/14/21  1157 07/02/20  0845 06/21/20  0930 02/27/20  1351    140 142 137   < > 139 137 138 139   POTASSIUM 4.3 4.4 3.7 3.6    < > 4.9 3.8 3.5 4.2   CHLORIDE 104 105 104 102   < > 104 107 104 105   CO2 26 26 24 22   < > 27 21* 23 23   ANIONGAP 8 9 14 13   < > 8 9 11 11   GLC 77 101* 116* 105*   < > 80 128* 139* 71   BUN 17.6 14.8 14.5 12.3   < > 12 12 10 13   CR 0.91 1.01* 0.91 0.90   < > 0.77 0.85 1.04 0.81   GFRESTIMATED 73 65 74 75   < > >60 >60 56* >60   GFRESTBLACK  --   --   --   --   --  >60 >60 >60 >60   MASHA 9.6 9.5 9.5 8.9   < > 10.1 9.1 10.2 9.6    < > = values in this interval not displayed.       UA RESULTS:   Recent Labs   Lab Test 12/10/24  1147 12/06/24  0926 07/02/20  0825 06/21/20  0858   SG >=1.030 1.025 1.010 1.019   URINEPH 5.5 5.5 5.5 5.5   NITRITE Negative Negative Negative Negative   RBCU  --   --  3-5* >100*   WBCU  --   --  25-50* 10-25*       CALCIUM RESULTS  Lab Results   Component Value Date    MASHA 9.6 12/10/2024    MASHA 9.5 12/06/2024    MASHA 9.5 02/23/2024           Imaging:    I personally reviewed all applicable imaging and went over the below findings with patient.    Results for orders placed or performed during the hospital encounter of 01/15/25   CT Abdomen Pelvis w Contrast    Narrative    EXAM: CT ABDOMEN PELVIS W CONTRAST  LOCATION: [facility]  DATE: 1/15/2025    INDICATION:  Right flank pain, Calculus of kidney  COMPARISON: 7/2/2020.  TECHNIQUE: CT scan of the abdomen and pelvis was performed following injection of IV contrast. Multiplanar reformats were obtained. Dose reduction techniques were used.  CONTRAST: 89 ml Isovue 370    FINDINGS:   LOWER CHEST: Moderate LAD and severe RCA calcification.    HEPATOBILIARY: Normal.    PANCREAS: Normal.    SPLEEN: Normal.    ADRENAL GLANDS: Normal.    KIDNEYS/BLADDER: There is a nonobstructing 2 mm right renal stone. No renal mass or hydronephrosis. No bladder wall thickening or calcification.    BOWEL: No free air, free fluid, or inflammatory change. No obstruction. Normal caliber appendix. Mild sigmoid diverticulosis.    LYMPH NODES:  Normal.    VASCULATURE: Mild calcified plaque in the infrarenal abdominal aorta and common iliac arteries but no aneurysm or stenosis.    PELVIC ORGANS: Prior hysterectomy.    MUSCULOSKELETAL: Severe degenerative disc disease in the lower lumbar spine.      Impression    IMPRESSION:   1.  No acute abnormality in the abdomen or pelvis.  2.  Single nonobstructing 2 mm stone in the lower pole of the right kidney.  3.  Severe coronary arterial calcification.

## 2025-01-29 NOTE — PATIENT INSTRUCTIONS
UROLOGY CLINIC VISIT PATIENT INSTRUCTIONS    -If having severe flank pain, fevers, chills, nausea, or vomiting please notify Urology clinic or be seen in the ER.       If you have any issues, questions or concerns in the meantime, do not hesitate to contact us at [facility] at [phone] or via [facility].     Maria D Ornelas CNP  Department of Urology

## 2025-01-30 ENCOUNTER — HOSPITAL ENCOUNTER (OUTPATIENT)
Dept: NUCLEAR MEDICINE | Facility: HOSPITAL | Age: 60
End: 2025-01-30
Attending: INTERNAL MEDICINE
Payer: COMMERCIAL

## 2025-01-30 DIAGNOSIS — E78.00 HYPERCHOLESTEROLEMIA: ICD-10-CM

## 2025-01-30 DIAGNOSIS — I25.10 CORONARY ARTERY CALCIFICATION: ICD-10-CM

## 2025-01-30 DIAGNOSIS — R06.09 DOE (DYSPNEA ON EXERTION): ICD-10-CM

## 2025-01-30 LAB
CV STRESS CURRENT BP HE: NORMAL
CV STRESS CURRENT HR HE: 109
CV STRESS CURRENT HR HE: 110
CV STRESS CURRENT HR HE: 110
CV STRESS CURRENT HR HE: 114
CV STRESS CURRENT HR HE: 114
CV STRESS CURRENT HR HE: 120
CV STRESS CURRENT HR HE: 128
CV STRESS CURRENT HR HE: 131
CV STRESS CURRENT HR HE: 135
CV STRESS CURRENT HR HE: 139
CV STRESS CURRENT HR HE: 141
CV STRESS CURRENT HR HE: 142
CV STRESS CURRENT HR HE: 61
CV STRESS CURRENT HR HE: 72
CV STRESS CURRENT HR HE: 74
CV STRESS CURRENT HR HE: 84
CV STRESS CURRENT HR HE: 84
CV STRESS CURRENT HR HE: 86
CV STRESS CURRENT HR HE: 90
CV STRESS CURRENT HR HE: 92
CV STRESS CURRENT HR HE: 92
CV STRESS CURRENT HR HE: 97
CV STRESS DEVIATION TIME HE: NORMAL
CV STRESS ECHO PERCENT HR HE: NORMAL
CV STRESS EXERCISE STAGE HE: NORMAL
CV STRESS EXERCISE STAGE REACHED HE: NORMAL
CV STRESS FINAL RESTING BP HE: NORMAL
CV STRESS FINAL RESTING HR HE: 90
CV STRESS MAX HR HE: 142
CV STRESS MAX TREADMILL GRADE HE: 14
CV STRESS MAX TREADMILL SPEED HE: 3.4
CV STRESS PEAK DIA BP HE: NORMAL
CV STRESS PEAK SYS BP HE: NORMAL
CV STRESS PHASE HE: NORMAL
CV STRESS PROTOCOL HE: NORMAL
CV STRESS RESTING PT POSITION HE: NORMAL
CV STRESS RESTING PT POSITION HE: NORMAL
CV STRESS ST DEVIATION AMOUNT HE: NORMAL
CV STRESS ST DEVIATION ELEVATION HE: NORMAL
CV STRESS ST EVELATION AMOUNT HE: NORMAL
CV STRESS TEST TYPE HE: NORMAL
CV STRESS TOTAL STAGE TIME MIN 1 HE: NORMAL
RATE PRESSURE PRODUCT: NORMAL
STRESS ECHO BASELINE DIASTOLIC HE: 78
STRESS ECHO BASELINE HR: 72
STRESS ECHO BASELINE SYSTOLIC BP: 124
STRESS ECHO CALCULATED PERCENT HR: 87 %
STRESS ECHO LAST STRESS DIASTOLIC BP: 78
STRESS ECHO LAST STRESS HR: 142
STRESS ECHO LAST STRESS SYSTOLIC BP: 162
STRESS ECHO POST ESTIMATED WORKLOAD: 9.2
STRESS ECHO POST EXERCISE DUR MIN: 7
STRESS ECHO POST EXERCISE DUR SEC: 17
STRESS ECHO TARGET HR: 163

## 2025-01-30 PROCEDURE — 93017 CV STRESS TEST TRACING ONLY: CPT

## 2025-01-30 PROCEDURE — A9500 TC99M SESTAMIBI: HCPCS | Performed by: INTERNAL MEDICINE

## 2025-01-30 PROCEDURE — 343N000001 HC RX 343 MED OP 636: Performed by: INTERNAL MEDICINE

## 2025-01-30 PROCEDURE — 78452 HT MUSCLE IMAGE SPECT MULT: CPT

## 2025-01-30 RX ADMIN — Medication 10.8 MILLICURIE: at 09:38

## 2025-02-03 DIAGNOSIS — R94.39 ABNORMAL STRESS TEST: ICD-10-CM

## 2025-02-03 DIAGNOSIS — I25.10 CORONARY ARTERY CALCIFICATION: Primary | ICD-10-CM

## 2025-02-03 DIAGNOSIS — E78.00 HYPERCHOLESTEROLEMIA: ICD-10-CM

## 2025-02-03 DIAGNOSIS — R06.09 DOE (DYSPNEA ON EXERTION): ICD-10-CM

## 2025-02-03 LAB
ABO + RH BLD: NORMAL
BLD GP AB SCN SERPL QL: NEGATIVE
SPECIMEN EXP DATE BLD: NORMAL

## 2025-02-04 ENCOUNTER — LAB (OUTPATIENT)
Dept: CARDIOLOGY | Facility: CLINIC | Age: 60
End: 2025-02-04
Payer: COMMERCIAL

## 2025-02-04 ENCOUNTER — PREP FOR PROCEDURE (OUTPATIENT)
Dept: CARDIOLOGY | Facility: CLINIC | Age: 60
End: 2025-02-04

## 2025-02-04 ENCOUNTER — TELEPHONE (OUTPATIENT)
Dept: CARDIOLOGY | Facility: CLINIC | Age: 60
End: 2025-02-04

## 2025-02-04 DIAGNOSIS — I25.10 CORONARY ARTERY CALCIFICATION: ICD-10-CM

## 2025-02-04 DIAGNOSIS — R06.09 DOE (DYSPNEA ON EXERTION): ICD-10-CM

## 2025-02-04 DIAGNOSIS — R94.39 ABNORMAL STRESS TEST: Primary | ICD-10-CM

## 2025-02-04 DIAGNOSIS — E78.00 HYPERCHOLESTEROLEMIA: ICD-10-CM

## 2025-02-04 DIAGNOSIS — R94.39 ABNORMAL STRESS TEST: ICD-10-CM

## 2025-02-04 LAB
ANION GAP SERPL CALCULATED.3IONS-SCNC: 9 MMOL/L (ref 7–15)
BUN SERPL-MCNC: 19.1 MG/DL (ref 6–20)
CALCIUM SERPL-MCNC: 10 MG/DL (ref 8.8–10.4)
CHLORIDE SERPL-SCNC: 102 MMOL/L (ref 98–107)
CREAT SERPL-MCNC: 0.92 MG/DL (ref 0.51–0.95)
EGFRCR SERPLBLD CKD-EPI 2021: 72 ML/MIN/1.73M2
ERYTHROCYTE [DISTWIDTH] IN BLOOD BY AUTOMATED COUNT: 12.5 % (ref 10–15)
GLUCOSE SERPL-MCNC: 96 MG/DL (ref 70–99)
HCO3 SERPL-SCNC: 26 MMOL/L (ref 22–29)
HCT VFR BLD AUTO: 44.4 % (ref 35–47)
HGB BLD-MCNC: 14.4 G/DL (ref 11.7–15.7)
MCH RBC QN AUTO: 28.1 PG (ref 26.5–33)
MCHC RBC AUTO-ENTMCNC: 32.4 G/DL (ref 31.5–36.5)
MCV RBC AUTO: 87 FL (ref 78–100)
PLATELET # BLD AUTO: 327 10E3/UL (ref 150–450)
POTASSIUM SERPL-SCNC: 4.1 MMOL/L (ref 3.4–5.3)
RBC # BLD AUTO: 5.12 10E6/UL (ref 3.8–5.2)
SODIUM SERPL-SCNC: 137 MMOL/L (ref 135–145)
WBC # BLD AUTO: 6.6 10E3/UL (ref 4–11)

## 2025-02-04 PROCEDURE — 86850 RBC ANTIBODY SCREEN: CPT

## 2025-02-04 PROCEDURE — 85027 COMPLETE CBC AUTOMATED: CPT

## 2025-02-04 PROCEDURE — 80048 BASIC METABOLIC PNL TOTAL CA: CPT

## 2025-02-04 PROCEDURE — 86901 BLOOD TYPING SEROLOGIC RH(D): CPT

## 2025-02-04 PROCEDURE — 36415 COLL VENOUS BLD VENIPUNCTURE: CPT

## 2025-02-04 PROCEDURE — 86900 BLOOD TYPING SEROLOGIC ABO: CPT

## 2025-02-04 RX ORDER — LIDOCAINE 40 MG/G
CREAM TOPICAL
Status: CANCELLED | OUTPATIENT
Start: 2025-02-04

## 2025-02-04 RX ORDER — ASPIRIN 325 MG
325 TABLET ORAL ONCE
Status: CANCELLED | OUTPATIENT
Start: 2025-02-04 | End: 2025-02-04

## 2025-02-04 RX ORDER — ASPIRIN 81 MG/1
243 TABLET, CHEWABLE ORAL ONCE
Status: CANCELLED | OUTPATIENT
Start: 2025-02-04

## 2025-02-04 RX ORDER — FENTANYL CITRATE 50 UG/ML
25 INJECTION, SOLUTION INTRAMUSCULAR; INTRAVENOUS
Status: CANCELLED | OUTPATIENT
Start: 2025-02-04

## 2025-02-04 RX ORDER — SODIUM CHLORIDE 9 MG/ML
INJECTION, SOLUTION INTRAVENOUS CONTINUOUS
Status: CANCELLED | OUTPATIENT
Start: 2025-02-04

## 2025-02-04 NOTE — TELEPHONE ENCOUNTER
Left message requesting callback to review procedure education. Noted did not show for 1300 lab appt today (2/4) to complete pre-procedure labs. Informed pt will make additional attempt to reach today and education needs to be completed prior to procedure. LMS

## 2025-02-04 NOTE — TELEPHONE ENCOUNTER
Kenia SANTIAGO Tim  [address]  [place] [address]  [phone] (home)     Procedure cardiologist:  Dr. Nye or Dr. Mansfield  PCP:  Cameron Barron  H&P completed by:  1/22/25 LORENA  Admit date: 2/5/25  Arrival time:  0730  Anticoagulation: None  CPAP: No  Previous PCI: No  Bypass Grafts: No  Renal Issues: No  Diabetic?: No  Device?: No  Type:  N/A  Ambulation status: Independent    Patient Education/  Angiogram Teaching    Reason for Visit:  Patient seen for pre-procedure education in preparation for: CA poss PCI    Procedure Prep:  Primary Cardiologist note dated: 1/22/25 KML  EKG results obtained, dated: To be done on admission  Hemogram results obtained: 2/4/25  Basic Metabolic Panel results obtained: 2/4/25  Lipid Profile results obtained: 1/22/25  Pertinent cardiac test results: 1/30/25 Abnormal NM stress    Pre-procedure instructions  Patient instructed to be NPO per anesthesia guidelines.  Patient instructed to shower the evening before or the morning of the procedure.  Patient instructed to arrange for transportation home following procedure from a responsible family member of friend (friend, Yuni). No driving for at least 24 hours.  Patient instructed to have a responsible adult with them for 24 hours post-procedure (friend, Yuni).  Post-procedure follow up process.  Conscious sedation discussed.    Pre-procedure medication instructions  Patient instructed on antiplatelet medication.  Continue medications as scheduled, with a small amount of water on the day of the procedure unless indicated.  Patient instructed to take 4-81 mg of Aspirin AM of procedure: yes  Other medication: Instructed to TAKE Prozac and Levothyroxine AM of the procedure. Instructed to HOLD all vitamins, supplements, and as needed medications AM of the procedure.       Patient states understanding of procedure and agrees to proceed.    *PATIENTS RECORDS AVAILABLE IN RECESS. UNLESS OTHERWISE INDICATED*      Patient  Active Problem List   Diagnosis    Calculus of ureter    Calculus of kidney    Acquired hypothyroidism       Current Outpatient Medications   Medication Sig Dispense Refill    amphetamine-dextroamphetamine (ADDERALL) 20 MG tablet Take 1 tablet (20 mg) by mouth as needed (In the morning as needed). 30 tablet 0    FLUoxetine (PROZAC) 40 MG capsule Take 1 capsule (40 mg) by mouth daily. 90 capsule 1    ibuprofen (ADVIL/MOTRIN) 600 MG tablet Take 800 mg by mouth as needed for moderate pain. Takes it rarely. She takes 4 of the 200 mg tablet      Lactobacillus acidoph-pectin (ACIDOPHILUS-PECTIN) 75 million cell -100 mg cap capsule [LACTOBACILLUS ACIDOPH-PECTIN (ACIDOPHILUS-PECTIN) 75 MILLION CELL -100 MG CAP CAPSULE] Take 1 capsule by mouth at bedtime.      levothyroxine (SYNTHROID/LEVOTHROID) 112 MCG tablet Take 1 tablet (112 mcg) by mouth daily. 90 tablet 3    naproxen (NAPROSYN) 500 MG tablet Take 1 tablet (500 mg) by mouth 2 times daily (with meals). 15 tablet 0    rosuvastatin (CRESTOR) 20 MG tablet Take 1 tablet (20 mg) by mouth daily. 34 tablet 3    traZODone (DESYREL) 100 MG tablet Take 1 tablet (100 mg) by mouth at bedtime. 90 tablet 1    Vitamin D, Cholecalciferol, 25 MCG (1000 UT) CAPS Take 2,000 Units by mouth daily         Allergies   Allergen Reactions    Tetracyclines & Related Unknown    Zyban [Bupropion] Unknown         Esperanza Contreras, RN, BSN

## 2025-02-04 NOTE — TELEPHONE ENCOUNTER
----- Message from Michelle Nix sent at 2/3/2025  2:54 PM CST -----  Regarding: RE: LORENA pt  Case type: CA POSS PCI    Procedure Physician(s): EBEN/ELTON    Procedure Date and Patient Arrival Time: Wednesday 2/5, with arrival time of 0730AM    H&P: Completed on 1/22 WITH DR. WANG    Pre-Procedure Lab Appt: Scheduled AT [facility] - Please place lab orders if you haven't already!    Alerts/Important Info: None    Interpretor Requested: NA      Thank you,  Michelle  ----- Message -----  From: Esperanza Contreras RN  Sent: 2/3/2025   9:06 AM CST  To: [facility], located within [facility] Procedure  Pool - [facility]  Subject: KML pt                                           Case Type: CA poss PCI  Ordering Provider: LORENA  H&P: 1/22/25 KML  Alerts: None  Additional Info/Urgency: Can go whenever  Orders for Pre-Procedure Labs? Please have pt complete labs (BMP, CBC, ABO) within 7 days of procedure at  or  only. Orders placed 2/3/25.

## 2025-02-05 ENCOUNTER — HOSPITAL ENCOUNTER (OUTPATIENT)
Facility: HOSPITAL | Age: 60
Discharge: HOME | End: 2025-02-05
Attending: INTERNAL MEDICINE | Admitting: INTERNAL MEDICINE
Payer: COMMERCIAL

## 2025-02-05 VITALS
HEART RATE: 60 BPM | DIASTOLIC BLOOD PRESSURE: 64 MMHG | TEMPERATURE: 98.1 F | SYSTOLIC BLOOD PRESSURE: 113 MMHG | HEIGHT: 68 IN | WEIGHT: 225 LBS | OXYGEN SATURATION: 95 % | BODY MASS INDEX: 34.1 KG/M2 | RESPIRATION RATE: 16 BRPM

## 2025-02-05 DIAGNOSIS — R06.09 DOE (DYSPNEA ON EXERTION): ICD-10-CM

## 2025-02-05 DIAGNOSIS — R94.39 ABNORMAL STRESS TEST: ICD-10-CM

## 2025-02-05 DIAGNOSIS — I25.10 CORONARY ARTERY CALCIFICATION: ICD-10-CM

## 2025-02-05 DIAGNOSIS — E78.00 HYPERCHOLESTEROLEMIA: ICD-10-CM

## 2025-02-05 DIAGNOSIS — R94.39 ABNORMAL NUCLEAR STRESS TEST: ICD-10-CM

## 2025-02-05 LAB
ATRIAL RATE - MUSE: 64 BPM
DIASTOLIC BLOOD PRESSURE - MUSE: NORMAL MMHG
INTERPRETATION ECG - MUSE: NORMAL
P AXIS - MUSE: 70 DEGREES
PR INTERVAL - MUSE: 182 MS
QRS DURATION - MUSE: 78 MS
QT - MUSE: 448 MS
QTC - MUSE: 462 MS
R AXIS - MUSE: 67 DEGREES
SYSTOLIC BLOOD PRESSURE - MUSE: NORMAL MMHG
T AXIS - MUSE: 50 DEGREES
VENTRICULAR RATE- MUSE: 64 BPM

## 2025-02-05 PROCEDURE — 93458 L HRT ARTERY/VENTRICLE ANGIO: CPT | Performed by: INTERNAL MEDICINE

## 2025-02-05 PROCEDURE — 250N000009 HC RX 250: Performed by: INTERNAL MEDICINE

## 2025-02-05 PROCEDURE — 258N000003 HC RX IP 258 OP 636: Performed by: INTERNAL MEDICINE

## 2025-02-05 PROCEDURE — 250N000011 HC RX IP 250 OP 636: Performed by: INTERNAL MEDICINE

## 2025-02-05 PROCEDURE — 999N000054 HC STATISTIC EKG NON-CHARGEABLE

## 2025-02-05 PROCEDURE — 255N000002 HC RX 255 OP 636: Performed by: INTERNAL MEDICINE

## 2025-02-05 PROCEDURE — 272N000001 HC OR GENERAL SUPPLY STERILE: Performed by: INTERNAL MEDICINE

## 2025-02-05 PROCEDURE — C1887 CATHETER, GUIDING: HCPCS | Performed by: INTERNAL MEDICINE

## 2025-02-05 PROCEDURE — 93458 L HRT ARTERY/VENTRICLE ANGIO: CPT | Mod: 26 | Performed by: INTERNAL MEDICINE

## 2025-02-05 PROCEDURE — C1894 INTRO/SHEATH, NON-LASER: HCPCS | Performed by: INTERNAL MEDICINE

## 2025-02-05 PROCEDURE — 99152 MOD SED SAME PHYS/QHP 5/>YRS: CPT | Performed by: INTERNAL MEDICINE

## 2025-02-05 PROCEDURE — 93005 ELECTROCARDIOGRAM TRACING: CPT

## 2025-02-05 RX ORDER — ASPIRIN 81 MG/1
243 TABLET, CHEWABLE ORAL ONCE
Status: COMPLETED | OUTPATIENT
Start: 2025-02-05 | End: 2025-02-05

## 2025-02-05 RX ORDER — FENTANYL CITRATE 50 UG/ML
25 INJECTION, SOLUTION INTRAMUSCULAR; INTRAVENOUS
Status: DISCONTINUED | OUTPATIENT
Start: 2025-02-05 | End: 2025-02-05 | Stop reason: HOSPADM

## 2025-02-05 RX ORDER — ASPIRIN 325 MG
325 TABLET ORAL ONCE
Status: COMPLETED | OUTPATIENT
Start: 2025-02-05 | End: 2025-02-05

## 2025-02-05 RX ORDER — FLUMAZENIL 0.1 MG/ML
0.2 INJECTION, SOLUTION INTRAVENOUS
Status: DISCONTINUED | OUTPATIENT
Start: 2025-02-05 | End: 2025-02-05 | Stop reason: HOSPADM

## 2025-02-05 RX ORDER — SODIUM CHLORIDE 9 MG/ML
INJECTION, SOLUTION INTRAVENOUS CONTINUOUS
Status: DISCONTINUED | OUTPATIENT
Start: 2025-02-05 | End: 2025-02-05 | Stop reason: HOSPADM

## 2025-02-05 RX ORDER — LIDOCAINE 40 MG/G
CREAM TOPICAL
Status: DISCONTINUED | OUTPATIENT
Start: 2025-02-05 | End: 2025-02-05 | Stop reason: HOSPADM

## 2025-02-05 RX ORDER — OXYCODONE HYDROCHLORIDE 5 MG/1
5 TABLET ORAL EVERY 4 HOURS PRN
Status: DISCONTINUED | OUTPATIENT
Start: 2025-02-05 | End: 2025-02-05 | Stop reason: HOSPADM

## 2025-02-05 RX ORDER — NALOXONE HYDROCHLORIDE 0.4 MG/ML
0.4 INJECTION, SOLUTION INTRAMUSCULAR; INTRAVENOUS; SUBCUTANEOUS
Status: DISCONTINUED | OUTPATIENT
Start: 2025-02-05 | End: 2025-02-05 | Stop reason: HOSPADM

## 2025-02-05 RX ORDER — ACETAMINOPHEN 325 MG/1
650 TABLET ORAL EVERY 4 HOURS PRN
Status: DISCONTINUED | OUTPATIENT
Start: 2025-02-05 | End: 2025-02-05 | Stop reason: HOSPADM

## 2025-02-05 RX ORDER — IODIXANOL 320 MG/ML
INJECTION, SOLUTION INTRAVASCULAR
Status: DISCONTINUED | OUTPATIENT
Start: 2025-02-05 | End: 2025-02-05 | Stop reason: HOSPADM

## 2025-02-05 RX ORDER — NALOXONE HYDROCHLORIDE 0.4 MG/ML
0.2 INJECTION, SOLUTION INTRAMUSCULAR; INTRAVENOUS; SUBCUTANEOUS
Status: DISCONTINUED | OUTPATIENT
Start: 2025-02-05 | End: 2025-02-05 | Stop reason: HOSPADM

## 2025-02-05 RX ORDER — OXYCODONE HYDROCHLORIDE 5 MG/1
10 TABLET ORAL EVERY 4 HOURS PRN
Status: DISCONTINUED | OUTPATIENT
Start: 2025-02-05 | End: 2025-02-05 | Stop reason: HOSPADM

## 2025-02-05 RX ORDER — FENTANYL CITRATE 50 UG/ML
INJECTION, SOLUTION INTRAMUSCULAR; INTRAVENOUS
Status: DISCONTINUED | OUTPATIENT
Start: 2025-02-05 | End: 2025-02-05 | Stop reason: HOSPADM

## 2025-02-05 RX ORDER — ATROPINE SULFATE 0.1 MG/ML
0.5 INJECTION INTRAVENOUS
Status: DISCONTINUED | OUTPATIENT
Start: 2025-02-05 | End: 2025-02-05 | Stop reason: HOSPADM

## 2025-02-05 RX ADMIN — SODIUM CHLORIDE: 9 INJECTION, SOLUTION INTRAVENOUS at 08:23

## 2025-02-05 ASSESSMENT — ACTIVITIES OF DAILY LIVING (ADL)
ADLS_ACUITY_SCORE: 41

## 2025-02-05 ASSESSMENT — EJECTION FRACTION: EF_VALUE: .17

## 2025-02-05 NOTE — PRE-PROCEDURE
GENERAL PRE-PROCEDURE:   Procedure:  Coronary angiogram with possible PCI  Date/Time:  2/5/2025 8:23 AM    Written consent obtained?: Yes    Risks and benefits: Risks, benefits and alternatives were discussed    Consent given by:  Patient  Patient states understanding of procedure being performed: Yes    Patient's understanding of procedure matches consent: Yes    Procedure consent matches procedure scheduled: Yes    Expected level of sedation:  Moderate  Appropriately NPO:  Yes  ASA Class:  3 (PAYNE, decreased exercise capacity, coronary calcifcation on CT, hypercholesterolemia, Class I obesity; BMI 34.72kg/m2, ADHD)  Mallampati  :  Grade 2- soft palate, base of uvula, tonsillar pillars, and portion of posterior pharyngeal wall visible  Lungs:  Lungs clear with good breath sounds bilaterally  Heart:  Normal heart sounds and rate  History & Physical reviewed:  History and physical reviewed and updates made (see comment)  H&P Comments:  Clinically Significant Risk Factors Present on Admission    Cardiovascular : PAYNE, decreased exercise capacity, coronary calcifcation on CT, hypercholesterolemia, Class I obesity; BMI 34.72kg/m2    Fluid & Electrolyte Disorders : Not present on admission    Gastroenterology : Not present on admission    Hematology/Oncology : Not present on admission    Nephrology : Not present on admission    Neurology : ADHD    Pulmonology : Not present on admission    Systemic :  Class I obesity; BMI 34.72kg/m2  Statement of review:  I have reviewed the lab findings, diagnostic data, medications, and the plan for sedation

## 2025-02-05 NOTE — INTERVAL H&P NOTE
"I have reviewed the surgical (or preoperative) H&P that is linked to this encounter, and examined the patient. There are no significant changes    Clinical Conditions Present on Arrival:  Clinically Significant Risk Factors Present on Admission                       # Obesity: Estimated body mass index is 34.72 kg/m  as calculated from the following:    Height as of this encounter: 1.715 m (5' 7.5\").    Weight as of this encounter: 102.1 kg (225 lb).       "

## 2025-02-05 NOTE — Clinical Note
[facility]CA Cine(s)  injected and visualized utilizing power injector system. Patient has given consent to record this visit for documentation in their clinical record.

## 2025-02-05 NOTE — DISCHARGE INSTRUCTIONS
Interventional Cardiology  Coronary Angiogram/Angioplasty/Stent/Atherectomy Discharge  Instructions - Radial (wrist) Approach   The instructions below are to help you understand how to take care of yourself. There is also information about when to call the doctor or emergency services.    For 24 hours after procedure:  Do not subject hand/arm to any forceful movements for 24 hours, such as supporting weight when rising from a chair or bed.  Do not drive a car for 24 hours.  The dressing on the puncture site may be removed after 24 hours and left open to air. If minor oozing, you may apply a Band-Aid and remove after 12 hours.   You may shower on the day after your procedure. Do not take a tub bath or wash dishes (no soaking wrist) with the puncture site in water for 3 days after the procedure.  For 48 hours following the procedure:  Do not operate a lawnmower, motorcycle, chainsaw or all-terrain vehicle.  Do not lift anything heavier than 5-10 pounds with affected arm for 5 days.  Avoid excessive bending (flexion/extension) wrist movement.  Do not engage in vigorous exercise (i.e. tennis, golf) using the affected arm for 5 days after discharge.  You may return to work in 72 hours if no complications and no heavy lifting.  If bleeding should occur following discharge:  Sit down and apply firm pressure with your thumb against the puncture site and fingers against back of wrist for 10 minutes.  If the bleeding stops, continue to rest, keeping your wrist still for 2 hours. Notify your doctor as soon as possible.  If bleeding does not stop after 10 minutes or if there is a large amount of bleeding or spurting, call 911 immediately. Do not drive yourself to the hospital.         Contact the Heart Clinic at [phone] if you develop:  Fever over 100.4, that lasts more than one day  Redness, heat, or pus at the puncture site  Change in color or temperature of the hand or arm    Expect mild tingling of hand and tenderness  at the puncture site for up to 3 days. You may take Tylenol or a pain medicine recommended by your doctor.           Your Procedural Physician was: Dr. Nye, the phone number is: [phone].    [facility]:  [phone]  If you are calling after hours, please listen to the entire voicemail, a live  will answer at the end of the message

## 2025-02-05 NOTE — PROGRESS NOTES
Patient is kept comfortable during post-procedure stay. VSS. Denies pain. Right radial access site remains dry & free from signs of bleeding. Tolerated food and fluids. Ambulated without issues. Appointment made & included in AVS. Dr. Nye was able to speak with patient post procedure. Post-op instructions reviewed and packet given to patient & spouse. Able to ask questions. Verbalized no concerns. Belongings returned. Discharged in stable condition.

## 2025-02-20 ENCOUNTER — TELEPHONE (OUTPATIENT)
Dept: INTERNAL MEDICINE | Facility: CLINIC | Age: 60
End: 2025-02-20

## 2025-02-20 ENCOUNTER — OFFICE VISIT (OUTPATIENT)
Dept: INTERNAL MEDICINE | Facility: CLINIC | Age: 60
End: 2025-02-20
Payer: COMMERCIAL

## 2025-02-20 VITALS
SYSTOLIC BLOOD PRESSURE: 121 MMHG | WEIGHT: 229 LBS | DIASTOLIC BLOOD PRESSURE: 81 MMHG | BODY MASS INDEX: 35.94 KG/M2 | OXYGEN SATURATION: 97 % | HEART RATE: 65 BPM | TEMPERATURE: 96.6 F | HEIGHT: 67 IN

## 2025-02-20 DIAGNOSIS — Z78.9 STATIN INTOLERANCE: ICD-10-CM

## 2025-02-20 DIAGNOSIS — I25.10 CORONARY ARTERY DISEASE DUE TO LIPID RICH PLAQUE: ICD-10-CM

## 2025-02-20 DIAGNOSIS — E66.01 MORBID OBESITY (H): Primary | ICD-10-CM

## 2025-02-20 DIAGNOSIS — R06.02 SOB (SHORTNESS OF BREATH) ON EXERTION: ICD-10-CM

## 2025-02-20 DIAGNOSIS — I25.83 CORONARY ARTERY DISEASE DUE TO LIPID RICH PLAQUE: ICD-10-CM

## 2025-02-20 DIAGNOSIS — R73.03 PREDIABETES: ICD-10-CM

## 2025-02-20 DIAGNOSIS — E78.2 MIXED HYPERLIPIDEMIA: ICD-10-CM

## 2025-02-20 PROCEDURE — 99214 OFFICE O/P EST MOD 30 MIN: CPT | Performed by: INTERNAL MEDICINE

## 2025-02-20 ASSESSMENT — PATIENT HEALTH QUESTIONNAIRE - PHQ9
SUM OF ALL RESPONSES TO PHQ QUESTIONS 1-9: 13
10. IF YOU CHECKED OFF ANY PROBLEMS, HOW DIFFICULT HAVE THESE PROBLEMS MADE IT FOR YOU TO DO YOUR WORK, TAKE CARE OF THINGS AT HOME, OR GET ALONG WITH OTHER PEOPLE: SOMEWHAT DIFFICULT
SUM OF ALL RESPONSES TO PHQ QUESTIONS 1-9: 13

## 2025-02-20 ASSESSMENT — PAIN SCALES - GENERAL: PAINLEVEL_OUTOF10: MODERATE PAIN (6)

## 2025-02-20 NOTE — TELEPHONE ENCOUNTER
Please start prior authorization.     Key - [id number]     Disp Refills Start End CHINO   semaglutide-weight management (WEGOVY) 0.25 MG/0.5ML pen 2 mL 0 2/20/2025 -- --   Sig - Route: Inject 0.5 mLs (0.25 mg) subcutaneously once a week. - Subcutaneous   Sent to pharmacy as: Semaglutide-Weight Management 0.25 MG/0.5ML Subcutaneous Solution Auto-injector (WEGOVY)   Class: E-Prescribe   Order: [id number]   E-Prescribing Status: Receipt confirmed by pharmacy (2/20/2025 12:43 PM CST)     Pharmacy    [id number] - [place], [place] - [address]     Associated Diagnoses    Morbid obesity (H) [E66.01]  - Primary      Prediabetes [R73.03]            Prescribing Provider's NPI: [id number]  Katelyn Saha

## 2025-02-20 NOTE — PROGRESS NOTES
"  {PROVIDER CHARTING PREFERENCE:839653}    Roderick Morrissey is a 59 year old, presenting for the following health issues:  office visit (Patient reports they are here to establish care. Looking for a new doctor, for 2.5 years hasn't felt good, tired, low energy, leg pain, brain fog. 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       {MA/LPN/RN Pre-Provider Visit Orders- hCG/UA/Strep (Optional):958383}  {SUPERLIST (Optional):834544}  {additonal problems for provider to add (Optional):923463}    {ROS Picklists (Optional):703858}      Objective    /81 (BP Location: Left arm, Patient Position: Sitting, Cuff Size: Adult Large)   Pulse 65   Temp (!) 96.6  F (35.9  C) (Temporal)   Ht 1.689 m (5' 6.5\")   Wt 103.9 kg (229 lb)   LMP  (LMP Unknown)   SpO2 97%   BMI 36.41 kg/m    Body mass index is 36.41 kg/m .  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                     Objective    /81 (BP Location: Left arm, Patient Position: Sitting, Cuff Size: Adult Large)   Pulse 65   Temp (!) 96.6  F (35.9  C) (Temporal)   Ht 1.689 m (5' 6.5\")   Wt 103.9 kg (229 lb)   LMP  (LMP Unknown)   SpO2 97%   BMI 36.41 kg/m    Body mass index is 36.41 kg/m .  Physical Exam   GENERAL: alert and no distress  NECK: no adenopathy, no asymmetry, masses, or scars  RESP: lungs clear to auscultation - no rales, rhonchi or wheezes  CV: regular rate and rhythm, normal S1 S2, no S3 or S4, no murmur, click or rub, no peripheral edema  ABDOMEN: soft, nontender, no hepatosplenomegaly, no masses and bowel sounds normal  MS: no gross musculoskeletal defects noted, no edema            Signed Electronically by: Katelyn Saha MD    "

## 2025-02-24 NOTE — TELEPHONE ENCOUNTER
Central Prior Authorization Team   Phone: [phone]    PA Initiation    Medication:   Insurance Company: [employer] - Phone [phone] Fax [phone]  Pharmacy Filling the Rx: [id number] - [place], [place] - [address]  Filling Pharmacy Phone: [phone]  Filling Pharmacy Fax:    Start Date: 2/24/2025    [facility] KEY:  [id number]

## 2025-02-26 NOTE — TELEPHONE ENCOUNTER
PRIOR AUTHORIZATION DENIED    Medication: Wegovy 0.25MG/0.5ML auto-injectors    Denial Date: 2/26/2025    Denial Rational:

## 2025-03-17 ENCOUNTER — LAB (OUTPATIENT)
Dept: LAB | Facility: CLINIC | Age: 60
End: 2025-03-17
Payer: COMMERCIAL

## 2025-03-17 DIAGNOSIS — M79.662 PAIN OF LEFT LOWER LEG: ICD-10-CM

## 2025-03-17 DIAGNOSIS — R73.03 PREDIABETES: ICD-10-CM

## 2025-03-17 DIAGNOSIS — Z11.59 NEED FOR HEPATITIS C SCREENING TEST: Primary | ICD-10-CM

## 2025-03-17 LAB
CK SERPL-CCNC: 131 U/L (ref 26–192)
EST. AVERAGE GLUCOSE BLD GHB EST-MCNC: 120 MG/DL
HBA1C MFR BLD: 5.8 % (ref 0–5.6)
HCV AB SERPL QL IA: NONREACTIVE
TSH SERPL DL<=0.005 MIU/L-ACNC: 3.87 UIU/ML (ref 0.3–4.2)

## 2025-03-17 PROCEDURE — 83036 HEMOGLOBIN GLYCOSYLATED A1C: CPT

## 2025-03-17 PROCEDURE — 86803 HEPATITIS C AB TEST: CPT

## 2025-03-17 PROCEDURE — 82550 ASSAY OF CK (CPK): CPT

## 2025-03-17 PROCEDURE — 36415 COLL VENOUS BLD VENIPUNCTURE: CPT

## 2025-03-17 PROCEDURE — 84443 ASSAY THYROID STIM HORMONE: CPT

## 2025-03-18 ENCOUNTER — TELEPHONE (OUTPATIENT)
Dept: INTERNAL MEDICINE | Facility: CLINIC | Age: 60
End: 2025-03-18
Payer: COMMERCIAL

## 2025-03-18 NOTE — TELEPHONE ENCOUNTER
Please start prior authorization    Key - [id number]       Disp Refills Start End CHINO   tirzepatide-Weight Management (ZEPBOUND) 2.5 MG/0.5ML prefilled pen 2 mL 0 3/14/2025 -- No   Sig - Route: Inject 0.5 mLs (2.5 mg) subcutaneously every 7 days. - Subcutaneous   Sent to pharmacy as: Tirzepatide-Weight Management 2.5 MG/0.5ML Subcutaneous Solution Auto-injector (ZEPBOUND)   Class: E-Prescribe   Order: [id number]   E-Prescribing Status: Receipt confirmed by pharmacy (3/14/2025 12:24 PM CDT)     Pharmacy    [facility][id number] - [place], [place] - [facility]     Associated Diagnoses    Coronary artery disease due to lipid rich plaque [I25.10, I25.83]  - Primary      Mixed hyperlipidemia [E78.2]      Prediabetes [R73.03]      Morbid obesity (H) [E66.01]            Prescribing Provider's NPI: [id number]  Katelyn Saha

## 2025-03-19 ENCOUNTER — HOSPITAL ENCOUNTER (OUTPATIENT)
Dept: CARDIOLOGY | Facility: HOSPITAL | Age: 60
Discharge: HOME | End: 2025-03-19
Attending: INTERNAL MEDICINE
Payer: COMMERCIAL

## 2025-03-19 DIAGNOSIS — I25.10 CORONARY ARTERY DISEASE DUE TO LIPID RICH PLAQUE: ICD-10-CM

## 2025-03-19 DIAGNOSIS — R06.02 SOB (SHORTNESS OF BREATH) ON EXERTION: ICD-10-CM

## 2025-03-19 DIAGNOSIS — I25.83 CORONARY ARTERY DISEASE DUE TO LIPID RICH PLAQUE: ICD-10-CM

## 2025-03-19 DIAGNOSIS — Z78.9 STATIN INTOLERANCE: ICD-10-CM

## 2025-03-19 DIAGNOSIS — E66.01 MORBID OBESITY (H): ICD-10-CM

## 2025-03-19 DIAGNOSIS — E78.2 MIXED HYPERLIPIDEMIA: ICD-10-CM

## 2025-03-19 LAB
LVEF ECHO: NORMAL
Lab: NORMAL
PERFORMING LABORATORY: NORMAL
SPECIMEN STATUS: NORMAL
TEST NAME: NORMAL

## 2025-03-19 PROCEDURE — 93306 TTE W/DOPPLER COMPLETE: CPT | Mod: 26 | Performed by: INTERNAL MEDICINE

## 2025-03-19 PROCEDURE — 93306 TTE W/DOPPLER COMPLETE: CPT

## 2025-03-20 NOTE — TELEPHONE ENCOUNTER
Retail Pharmacy Prior Authorization Team   Phone: [phone]    PRIOR AUTHORIZATION DENIED    Medication: ZEPBOUND 2.5 MG/0.5ML SC SOAJ  Insurance Company: [employer] Non-Specialty PA's - Phone [phone] Fax [phone]  Denial Date: 3/20/2025  Denial Reason(s): DRUG NOT COVERED - PLAN EXCLUSION  Appeal Information: N/A  Patient Notified: NO

## 2025-03-31 ENCOUNTER — OFFICE VISIT (OUTPATIENT)
Dept: INTERNAL MEDICINE | Facility: CLINIC | Age: 60
End: 2025-03-31
Payer: COMMERCIAL

## 2025-03-31 VITALS
TEMPERATURE: 97.8 F | RESPIRATION RATE: 16 BRPM | SYSTOLIC BLOOD PRESSURE: 113 MMHG | WEIGHT: 231 LBS | BODY MASS INDEX: 36.26 KG/M2 | HEART RATE: 71 BPM | HEIGHT: 67 IN | DIASTOLIC BLOOD PRESSURE: 76 MMHG | OXYGEN SATURATION: 96 %

## 2025-03-31 DIAGNOSIS — E66.01 MORBID OBESITY (H): ICD-10-CM

## 2025-03-31 DIAGNOSIS — R06.02 SOB (SHORTNESS OF BREATH): Primary | ICD-10-CM

## 2025-03-31 DIAGNOSIS — I25.10 CORONARY ARTERY CALCIFICATION: ICD-10-CM

## 2025-03-31 DIAGNOSIS — E78.00 HYPERCHOLESTEROLEMIA: ICD-10-CM

## 2025-03-31 PROCEDURE — 3078F DIAST BP <80 MM HG: CPT | Performed by: INTERNAL MEDICINE

## 2025-03-31 PROCEDURE — 99213 OFFICE O/P EST LOW 20 MIN: CPT | Performed by: INTERNAL MEDICINE

## 2025-03-31 PROCEDURE — 3074F SYST BP LT 130 MM HG: CPT | Performed by: INTERNAL MEDICINE

## 2025-03-31 PROCEDURE — 1126F AMNT PAIN NOTED NONE PRSNT: CPT | Performed by: INTERNAL MEDICINE

## 2025-03-31 RX ORDER — ROSUVASTATIN CALCIUM 20 MG/1
20 TABLET, COATED ORAL DAILY
Qty: 34 TABLET | Refills: 3 | Status: SHIPPED | OUTPATIENT
Start: 2025-03-31

## 2025-03-31 ASSESSMENT — ANXIETY QUESTIONNAIRES
7. FEELING AFRAID AS IF SOMETHING AWFUL MIGHT HAPPEN: SEVERAL DAYS
7. FEELING AFRAID AS IF SOMETHING AWFUL MIGHT HAPPEN: SEVERAL DAYS
1. FEELING NERVOUS, ANXIOUS, OR ON EDGE: MORE THAN HALF THE DAYS
GAD7 TOTAL SCORE: 12
8. IF YOU CHECKED OFF ANY PROBLEMS, HOW DIFFICULT HAVE THESE MADE IT FOR YOU TO DO YOUR WORK, TAKE CARE OF THINGS AT HOME, OR GET ALONG WITH OTHER PEOPLE?: SOMEWHAT DIFFICULT
4. TROUBLE RELAXING: SEVERAL DAYS
5. BEING SO RESTLESS THAT IT IS HARD TO SIT STILL: SEVERAL DAYS
3. WORRYING TOO MUCH ABOUT DIFFERENT THINGS: MORE THAN HALF THE DAYS
IF YOU CHECKED OFF ANY PROBLEMS ON THIS QUESTIONNAIRE, HOW DIFFICULT HAVE THESE PROBLEMS MADE IT FOR YOU TO DO YOUR WORK, TAKE CARE OF THINGS AT HOME, OR GET ALONG WITH OTHER PEOPLE: SOMEWHAT DIFFICULT
2. NOT BEING ABLE TO STOP OR CONTROL WORRYING: NEARLY EVERY DAY
GAD7 TOTAL SCORE: 12
GAD7 TOTAL SCORE: 12
6. BECOMING EASILY ANNOYED OR IRRITABLE: MORE THAN HALF THE DAYS

## 2025-03-31 ASSESSMENT — PAIN SCALES - GENERAL: PAINLEVEL_OUTOF10: NO PAIN (0)

## 2025-03-31 NOTE — PROGRESS NOTES
"  Assessment & Plan     Coronary artery calcification  Trial of stopping the statin did not improve her fatigue shortness of breath or muscle pain.  Because of her presence of coronary calcification severe recommend restarting LDL target should be less than 70.  - rosuvastatin (CRESTOR) 20 MG tablet; Take 1 tablet (20 mg) by mouth daily.    Hypercholesterolemia    - rosuvastatin (CRESTOR) 20 MG tablet; Take 1 tablet (20 mg) by mouth daily.    SOB (shortness of breath)  Seniors to be excessive having exercise-induced shortness of breath of note cardiogram recently done does not show significant diastolic or systolic congestive heart failure valvular abnormalities abnormal stress test was worked up with a coronary angiogram which showed no significant obstructive coronary artery disease.  Function test had been ordered but she has not had them done was reordered.  Thyroid was normal sleep study is pending.  She feels obesity is playing a significant role but unfortunately the Ozempic was denied.  She is willing to explore compounded Zepbound through [facility].  Will try that.  We discussed Wellbutrin/naltrexone but she wants to think about it for now.  Her prior authorization was declined but because of her coronary artery disease we will send the letter of appeal.  - General PFT Lab (Please always keep checked); Future          BMI  Estimated body mass index is 36.73 kg/m  as calculated from the following:    Height as of this encounter: 1.689 m (5' 6.5\").    Weight as of this encounter: 104.8 kg (231 lb).             Roderick Morrissey is a 59 year old, presenting for the following health issues:  LDL Cholesterol Calculated   Date Value Ref Range Status   01/22/2025 100 (H) <100 mg/dL Final       Follow Up and Recheck Medication (Pt reports that she's here to follow up and discuss all issues that have been going on in the past, and to discuss echo cardiogram results.)      3/31/2025    10:04 AM   Additional Questions " "  Roomed by daniel   Accompanied by alone         3/31/2025    10:04 AM   Patient Reported Additional Medications   Patient reports taking the following new medications none     History of Present Illness       Mental Health Follow-up:  Patient presents to follow-up on Depression & Anxiety.Patient's depression since last visit has been:  No change  The patient is not having other symptoms associated with depression.  Patient's anxiety since last visit has been:  No change  The patient is not having other symptoms associated with anxiety.  Any significant life events: health concerns  Patient is feeling anxious or having panic attacks.  Patient has no concerns about alcohol or drug use.    Hyperlipidemia:  She presents for follow up of hyperlipidemia.   She is not taking medication to lower cholesterol. She is having myalgia or other side effects to statin medications.    She eats 0-1 servings of fruits and vegetables daily.She consumes 0 sweetened beverage(s) daily.She exercises with enough effort to increase her heart rate 30 to 60 minutes per day.  She exercises with enough effort to increase her heart rate 4 days per week.   She is taking medications regularly.                      Objective    /76 (BP Location: Left arm, Patient Position: Sitting, Cuff Size: Adult Large)   Pulse 71   Temp 97.8  F (36.6  C) (Tympanic)   Resp 16   Ht 1.689 m (5' 6.5\")   Wt 104.8 kg (231 lb)   LMP  (LMP Unknown)   SpO2 96%   Breastfeeding No   BMI 36.73 kg/m    Body mass index is 36.73 kg/m .  Physical Exam               Signed Electronically by: Katelyn Shaa MD    "

## 2025-03-31 NOTE — LETTER
3/31/2025    Kenia Dumont   1965        To Whom it May Concern;    The above named patient is under my care . She suffers from morbid obesity with prediabetes and knee ostearthritis and coronary artery atherosclerosis confirmed on coronary angiogram . She has a high risk of future cardiac events . She is on a statin but dose is limited due to side effects. Her prior authorization for tirzepatide  was declined however it is medically recommended and imperative for her health that she receive treatment with a she be on a GLP agonist preferable tirzepatide to lose weight and manage her hyperlipidemia ad comorbidities  and reduce her future risk for cardiovascular disease   Sincerely,        Katelyn Saha MD

## 2025-04-08 ENCOUNTER — OFFICE VISIT (OUTPATIENT)
Dept: DERMATOLOGY | Facility: CLINIC | Age: 60
End: 2025-04-08
Payer: COMMERCIAL

## 2025-04-08 DIAGNOSIS — L28.1 PRURIGO NODULARIS: Primary | ICD-10-CM

## 2025-04-08 PROCEDURE — 99203 OFFICE O/P NEW LOW 30 MIN: CPT | Performed by: PHYSICIAN ASSISTANT

## 2025-04-08 RX ORDER — CLOBETASOL PROPIONATE 0.5 MG/G
CREAM TOPICAL
Qty: 60 G | Refills: 3 | Status: SHIPPED | OUTPATIENT
Start: 2025-04-08

## 2025-04-08 NOTE — LETTER
4/8/2025      Kenia Dumont  [address]  [place] [address]      Dear Colleague,    Thank you for referring your patient, Kenia Dumont, to the [facility]. Please see a copy of my visit note below.    HPI:   Chief complaints: Kenia Dumont is a pleasant 59 year old female who presents for evaluation of bumps on the arms. They have been present for about 1 year and will start out as a small bump which she scratches. She has several areas that have healed on the arms. She does have old spots on the upper back as well. She has two areas on the right lower leg and these are itchy. She has not yet tried any medications.       PHYSICAL EXAM:    LMP  (LMP Unknown)   Skin exam performed as follows: Type 2 skin. Mood appropriate  Alert and Oriented X 3. Well developed, well nourished in no distress.  General appearance: Normal  Head including face: Normal  Eyes: conjunctiva and lids: Normal  Mouth: Lips, teeth, gums: Normal  Neck: Normal  Skin: Scalp and body hair: See below    PIH and light scarring on bilateral upper arms and forearms; scarring and PIH on the upper back. Excoriated papule on the right lower leg    ASSESSMENT/PLAN:     Prurigo nodularis - discussed condition of uncertain etiology and treatment options.   --Start clobetasol cream to new/itchy areas BID x 2-3 weeks until resolved then PRN  --Try to not pick or scratch the areas          Follow-up: PRN  CC:   Scribed By: Jamila Silverio, MS, PADimitriosC      Again, thank you for allowing me to participate in the care of your patient.        Sincerely,        Jamila Silverio PA-C    Electronically signed

## 2025-04-08 NOTE — PROGRESS NOTES
HPI:   Chief complaints: Kenia Dumont is a pleasant 59 year old female who presents for evaluation of bumps on the arms. They have been present for about 1 year and will start out as a small bump which she scratches. She has several areas that have healed on the arms. She does have old spots on the upper back as well. She has two areas on the right lower leg and these are itchy. She has not yet tried any medications.       PHYSICAL EXAM:    LMP  (LMP Unknown)   Skin exam performed as follows: Type 2 skin. Mood appropriate  Alert and Oriented X 3. Well developed, well nourished in no distress.  General appearance: Normal  Head including face: Normal  Eyes: conjunctiva and lids: Normal  Mouth: Lips, teeth, gums: Normal  Neck: Normal  Skin: Scalp and body hair: See below    PIH and light scarring on bilateral upper arms and forearms; scarring and PIH on the upper back. Excoriated papule on the right lower leg    ASSESSMENT/PLAN:     Prurigo nodularis - discussed condition of uncertain etiology and treatment options.   --Start clobetasol cream to new/itchy areas BID x 2-3 weeks until resolved then PRN  --Try to not pick or scratch the areas          Follow-up: PRN  CC:   Scribed By: Jamila Silverio, MS, PA-C

## 2025-04-11 ENCOUNTER — DOCUMENTATION ONLY (OUTPATIENT)
Dept: CARDIOLOGY | Facility: CLINIC | Age: 60
End: 2025-04-11
Payer: COMMERCIAL

## 2025-04-11 DIAGNOSIS — E66.01 MORBID OBESITY (H): Primary | ICD-10-CM

## 2025-04-11 DIAGNOSIS — I25.10 CORONARY ARTERY DISEASE DUE TO LIPID RICH PLAQUE: ICD-10-CM

## 2025-04-11 DIAGNOSIS — R06.02 SOB (SHORTNESS OF BREATH) ON EXERTION: ICD-10-CM

## 2025-04-11 DIAGNOSIS — E78.2 MIXED HYPERLIPIDEMIA: ICD-10-CM

## 2025-04-11 DIAGNOSIS — I25.83 CORONARY ARTERY DISEASE DUE TO LIPID RICH PLAQUE: ICD-10-CM

## 2025-04-11 NOTE — PROGRESS NOTES
Noted pt cancelled 3/24/25 follow-up with [facility] 6 weeks s/p Coronary Angiogram. Follow-up placed, message sent to pt to help pt to reschedule. RAFFI

## 2025-05-06 ENCOUNTER — MYC REFILL (OUTPATIENT)
Dept: INTERNAL MEDICINE | Facility: CLINIC | Age: 60
End: 2025-05-06
Payer: COMMERCIAL

## 2025-05-06 DIAGNOSIS — I25.83 CORONARY ARTERY DISEASE DUE TO LIPID RICH PLAQUE: ICD-10-CM

## 2025-05-06 DIAGNOSIS — E66.01 MORBID OBESITY (H): ICD-10-CM

## 2025-05-06 DIAGNOSIS — I25.10 CORONARY ARTERY DISEASE DUE TO LIPID RICH PLAQUE: ICD-10-CM

## 2025-05-06 DIAGNOSIS — E78.2 MIXED HYPERLIPIDEMIA: ICD-10-CM

## 2025-05-06 DIAGNOSIS — R73.03 PREDIABETES: ICD-10-CM

## 2025-05-19 DIAGNOSIS — M79.662 PAIN OF LEFT LOWER LEG: ICD-10-CM

## 2025-05-20 RX ORDER — GABAPENTIN 300 MG/1
CAPSULE ORAL
Qty: 60 CAPSULE | Refills: 11 | Status: SHIPPED | OUTPATIENT
Start: 2025-05-20

## 2025-06-04 ENCOUNTER — VIRTUAL VISIT (OUTPATIENT)
Dept: SLEEP MEDICINE | Facility: CLINIC | Age: 60
End: 2025-06-04
Payer: COMMERCIAL

## 2025-06-04 VITALS — HEIGHT: 67 IN | BODY MASS INDEX: 35.16 KG/M2 | WEIGHT: 224 LBS

## 2025-06-04 DIAGNOSIS — R06.02 SOB (SHORTNESS OF BREATH) ON EXERTION: ICD-10-CM

## 2025-06-04 DIAGNOSIS — E78.2 MIXED HYPERLIPIDEMIA: ICD-10-CM

## 2025-06-04 DIAGNOSIS — G47.21 CIRCADIAN RHYTHM SLEEP DISORDER, DELAYED SLEEP PHASE TYPE: ICD-10-CM

## 2025-06-04 DIAGNOSIS — G47.8 UNREFRESHED BY SLEEP: ICD-10-CM

## 2025-06-04 DIAGNOSIS — F41.9 ANXIETY: ICD-10-CM

## 2025-06-04 DIAGNOSIS — R29.818 SUSPECTED SLEEP APNEA: Primary | ICD-10-CM

## 2025-06-04 DIAGNOSIS — R53.83 FATIGUE, UNSPECIFIED TYPE: ICD-10-CM

## 2025-06-04 DIAGNOSIS — Z78.9 STATIN INTOLERANCE: ICD-10-CM

## 2025-06-04 DIAGNOSIS — F45.8 BRUXISM: ICD-10-CM

## 2025-06-04 PROCEDURE — 1126F AMNT PAIN NOTED NONE PRSNT: CPT | Performed by: INTERNAL MEDICINE

## 2025-06-04 PROCEDURE — 98003 SYNCH AUDIO-VIDEO NEW HI 60: CPT | Performed by: INTERNAL MEDICINE

## 2025-06-04 ASSESSMENT — SLEEP AND FATIGUE QUESTIONNAIRES
HOW LIKELY ARE YOU TO NOD OFF OR FALL ASLEEP WHILE SITTING AND READING: MODERATE CHANCE OF DOZING
HOW LIKELY ARE YOU TO NOD OFF OR FALL ASLEEP WHILE SITTING QUIETLY AFTER LUNCH WITHOUT ALCOHOL: WOULD NEVER DOZE
HOW LIKELY ARE YOU TO NOD OFF OR FALL ASLEEP IN A CAR, WHILE STOPPED FOR A FEW MINUTES IN TRAFFIC: WOULD NEVER DOZE
HOW LIKELY ARE YOU TO NOD OFF OR FALL ASLEEP WHEN YOU ARE A PASSENGER IN A CAR FOR AN HOUR WITHOUT A BREAK: WOULD NEVER DOZE
HOW LIKELY ARE YOU TO NOD OFF OR FALL ASLEEP WHILE WATCHING TV: MODERATE CHANCE OF DOZING
HOW LIKELY ARE YOU TO NOD OFF OR FALL ASLEEP WHILE LYING DOWN TO REST IN THE AFTERNOON WHEN CIRCUMSTANCES PERMIT: MODERATE CHANCE OF DOZING
HOW LIKELY ARE YOU TO NOD OFF OR FALL ASLEEP WHILE SITTING INACTIVE IN A PUBLIC PLACE: WOULD NEVER DOZE
HOW LIKELY ARE YOU TO NOD OFF OR FALL ASLEEP WHILE SITTING AND TALKING TO SOMEONE: WOULD NEVER DOZE

## 2025-06-04 ASSESSMENT — PAIN SCALES - GENERAL: PAINLEVEL_OUTOF10: NO PAIN (0)

## 2025-06-04 NOTE — NURSING NOTE
Current patient location: [address] [place] [address] [address]  [place] [address]    Is the patient currently in the state of [place]? YES    Visit mode: VIDEO    If the visit is dropped, the patient can be reconnected by:VIDEO VISIT: Text to cell phone:   Telephone Information:   Mobile [phone]       Will anyone else be joining the visit? NO  (If patient encounters technical issues they should call [phone])    Are changes needed to the allergy or medication list? No    Are refills needed on medications prescribed by this physician? NO    Rooming Documentation:  Questionnaire(s) completed    Reason for visit: Consult    Cameron MCCURDY

## 2025-06-04 NOTE — PROGRESS NOTES
Virtual Visit Details    Type of service:  Video Visit   Originating Location (pt. Location): Home    Distant Location (provider location):  Off-site  Platform used for Video Visit: [facility]    Outpatient Sleep Medicine Consultation:      Name: Kenia Dumont MRN# [id number]   Age: 59 year old YOB: 1965     Date of Consultation: June 4, 2025  Consultation is requested by: Katelyn Saha MD  [facility] [place] [id number] Katelyn Saha  Primary care provider: Katelyn Saha       Reason for Sleep Consult:     Kenia Dumont is sent by Katelyn Saha for a sleep consultation regarding fatigue, possible sleep apnea.           Assessment and Plan:     Summary Sleep Diagnoses:  Nonrestorative sleep, fatigue.  STOP-BANG score 3 out of 8.  Possible obstructive sleep apnea  Patient's risk factors for CATHY include postmenopausal status and BMI.  Bruxism  Tendency towards delayed sleep phase    Comorbid Diagnoses:  Coronary artery disease coronary calcification on abdomen CT  ADHD  Hyperlipidemia  Anxiety  Acquired hypothyroidism  Renal calculus  BMI      Summary Recommendations:  Home sleep apnea testing and polysomnography reviewed.  Recommended obtaining home sleep study to evaluate for possible sleep disordered breathing. If the home sleep study does not show evidence of CATHY, we will consider obtaining in-lab sleep study and the patient was agreeable with the plan.  Discussed pathophysiology and risks of untreated CATHY.  Information provided regarding treatment options for CATHY.  If the sleep study shows evidence of moderate to severe obstructive sleep apnea and hypoxemia, we discussed CPAP would be safe and effective treatment option.  If the sleep study shows mild CATHY ,we discussed the option of treatment using mandibular advancement device through referral to sleep dentistry since she reports bruxism.    Patient will follow up 3 months after the sleep study. We will review results and  "initiate treatment (if indicated) over MyChart prior to the follow up.   Recommended following a regular sleep schedule every day of the week aiming at obtaining 7 to 8 hours of sleep per night and avoiding sleep deprivation.  Recommended exposure to bright light soon after the awakening in the morning using bright lightbox of 10,000 Lux intensity for 30 to 60 minutes  Patient was instructed to avoid napping during the day  We discussed weight management with diet and exercise  Patient was strongly advised to avoid driving, operating any heavy machinery or other hazardous situations while drowsy or sleepy.  Patient was counseled on the importance of driving while alert, to pull over if drowsy, or nap before getting into the vehicle if sleepy.     Orders Placed This Encounter   Procedures    HST-Home Sleep Apnea Test - Noxturnal Returnable         Summary Counseling:    Sleep Testing Reviewed  Obstructive Sleep Apnea Reviewed  Complications of Untreated Sleep Apnea Reviewed       Medical Decision-making:   Educational materials provided in instructions       CC: Katelyn Saha MD       The above note was dictated using voice recognition software. Although reviewed after completion, some word and grammatical error may remain . Please contact the author for any clarifications.       \" Total time spent was 60 minutes for this appointment on this date of service which include time spent before, during and after the visit for chart review, patient care, counseling and coordination of care including documentation.\"      Laury Wei MD  [facility]  [address] [place] [address]            History of Present Illness:      Past Sleep Evaluations:None      SLEEP-WAKE SCHEDULE:     Work/School Days: Patient goes to school/work: Yes  Usually gets into bed at 1030-11pm  Takes patient not too long to fall asleep-almost right away most nights   Has trouble falling asleep " 0 nights per week  Wakes up in the middle of the night 1-2 times.  Wakes up due to use bathroom  She denies trouble falling back asleep . Falls back asleep right away  Patient is usually up at 530-6 am  Uses alarm: Yes    Weekends/Non-work Days/All Other Days:  Usually gets into bed at 11pm-12 midnight  Takes patient not too long to fall asleep-almost right away falls asleep  Patient is usually up at 7-8 am  Uses alarm: No    She reports non restorative sleep, and fatigue. Denies EDS  Sleep Need  Patient gets  6-7 hours of  sleep on average   Patient does not know how much she needs     Kenia Dumont prefers to sleep in this position(s):back and sides   Patient states they do the following activities in bed:     Naps  Patient takes a purposeful nap some times on weekends and naps are usually 3-4 hours in duration  She feels better after a nap: No  She dozes off unintentionally No days per week  Patient has had a driving accident or near-miss due to sleepiness/drowsiness: No      SLEEP DISRUPTIONS:    Breathing/Snoring  Patient snores: No (sleeps alone)  Other people complain about her snoring: No   Patient has been told she stops breathing in her sleep: No(sleeps alone)  She has issues with the following:  occasional heart burn, sometimes wakes up more than once to use bathroom    Movement:  Patient reports lower extremity pain, toward hip upper part of leg Lt >Rt all the time even when walking  She denies symptoms suggestive of RLS       Behaviors in Sleep:  Teeth grinding according to her dentist-does not use mouthguard   There are no reports of sleepwalking, sleep eating, sleep talking or dream enactment behavior  She has experienced sudden muscle weakness during the day: No    Is there anything else you would like your sleep provider to know:        CAFFEINE AND OTHER SUBSTANCES:    Patient consumes caffeinated beverages per day: once in a while tiny can of soda/cup of coffee  Last caffeine use is usually:  before noon  List of any prescribed or over the counter stimulants that patient takes: No  List of any prescribed or over the counter sleep medication patient takes:Yes- Trazodone(several years)  List of previous sleep medications that patient has tried:melatonin didn't work   Patient drinks alcohol to help them sleep: No  Patient drinks alcohol near bedtime: No    Family History:  Patient has a family member been diagnosed with a sleep disorder: No        SCALES:    EPWORTH SLEEPINESS SCALE         6/4/2025     9:07 AM    [place] Sleepiness Scale ( CASIE Rhodes  [id number]<br>ESS - [place]/English - Final version - 21 Nov 07 - [facility] [place].)   Sitting and reading Moderate chance of dozing   Watching TV Moderate chance of dozing   Sitting, inactive in a public place (e.g. a theatre or a meeting) Would never doze   As a passenger in a car for an hour without a break Would never doze   Lying down to rest in the afternoon when circumstances permit Moderate chance of dozing   Sitting and talking to someone Would never doze   Sitting quietly after a lunch without alcohol Would never doze   In a car, while stopped for a few minutes in traffic Would never doze   [place] Score (MC) 6   [place] Score (Sleep) 6        Proxy-reported         INSOMNIA SEVERITY INDEX (ALLYSON)          6/4/2025     9:05 AM   Insomnia Severity Index (ALLYSON)   Difficulty falling asleep 0    Difficulty staying asleep 0    Problems waking up too early 0    How SATISFIED/DISSATISFIED are you with your CURRENT sleep pattern? 1    How NOTICEABLE to others do you think your sleep problem is in terms of impairing the quality of your life? 0    How WORRIED/DISTRESSED are you about your current sleep problem? 0    To what extent do you consider your sleep problem to INTERFERE with your daily functioning (e.g. daytime fatigue, mood, ability to function at work/daily chores, concentration, memory, mood, etc.) CURRENTLY? 0    ALLYSON Total Score 1         "Proxy-reported       Guidelines for Scoring/Interpretation:  Total score categories:  0-7 = No clinically significant insomnia   8-14 = Subthreshold insomnia   15-21 = Clinical insomnia (moderate severity)  22-28 = Clinical insomnia (severe)  Used via courtesy of www.[place].va.gov with permission from Eamon Otero PhD., [facility]      STOP BANG 3/8        6/4/2025     9:02 AM   STOP BANG Questionnaire (  2008, the American Society of Anesthesiologists, Inc. [employer]   B/P Clinic: --   BMI Clinic: 35.08         GAD7        3/31/2025     9:53 AM   NAPOLEON-7    1. Feeling nervous, anxious, or on edge 2   2. Not being able to stop or control worrying 3   3. Worrying too much about different things 2   4. Trouble relaxing 1   5. Being so restless that it is hard to sit still 1   6. Becoming easily annoyed or irritable 2   7. Feeling afraid, as if something awful might happen 1   NAPOLEON-7 Total Score 12    If you checked any problems, how difficult have they made it for you to do your work, take care of things at home, or get along with other people? Somewhat difficult       Patient-reported         CAGE-AID         No data to display                CAGE-AID reprinted with permission from the [place] Medical Journal, ELVIN Groves. and MALLORIE Leiva, \"Conjoint screening questionnaires for alcohol and drug abuse\" [place] Medical Journal 94: 135-140, 1995.      PATIENT HEALTH QUESTIONNAIRE-9 (PHQ - 9)        2/20/2025    11:28 AM   PHQ-9 ([employer])   1.  Little interest or pleasure in doing things 1   2.  Feeling down, depressed, or hopeless 1   3.  Trouble falling or staying asleep, or sleeping too much 1   4.  Feeling tired or having little energy 3   5.  Poor appetite or overeating 1   6.  Feeling bad about yourself - or that you are a failure or have let yourself or your family down 2   7.  Trouble concentrating on things, such as reading the newspaper or watching television 3   8.  Moving " or speaking so slowly that other people could have noticed. Or the opposite - being so fidgety or restless that you have been moving around a lot more than usual 1   9.  Thoughts that you would be better off dead, or of hurting yourself in some way 0   PHQ-9 Total Score 13    6.  Feeling bad about yourself 2   7.  Trouble concentrating 3   8.  Moving slowly or restless 1   9.  Suicidal or self-harm thoughts 0   1.  Little interest or pleasure in doing things Several days   2.  Feeling down, depressed, or hopeless Several days   3.  Trouble falling or staying asleep, or sleeping too much Several days   4.  Feeling tired or having little energy Nearly every day   5.  Poor appetite or overeating Several days   6.  Feeling bad about yourself More than half the days   7.  Trouble concentrating Nearly every day   8.  Moving slowly or restless Several days   9.  Suicidal or self-harm thoughts Not at all   PHQ-9 via [employer] TOTAL SCORE-----> 13 (Moderate depression)   Difficulty at work, home, or with people Somewhat difficult       Patient-reported       Developed by Chaka Ogden, Maryan Hutchinson, Rene Badillo and colleagues, with an educational amita from [employer] No permission required to reproduce, translate, display or distribute.        Allergies:    Allergies   Allergen Reactions    Tetracyclines & Related Unknown    Zyban [Bupropion] Unknown       Medications:    Current Outpatient Medications   Medication Sig Dispense Refill    clobetasol propionate (TEMOVATE) 0.05 % external cream Apply to AA BID x 1-2 weeks then PRN. Do not apply to face. 60 g 3    FLUoxetine (PROZAC) 40 MG capsule Take 1 capsule (40 mg) by mouth daily. 90 capsule 1    gabapentin (NEURONTIN) 300 MG capsule Start 300mg at night for 3 days then increase to twice daily. 60 capsule 11    ibuprofen (ADVIL/MOTRIN) 600 MG tablet Take 800 mg by mouth as needed for moderate pain. Takes it rarely. She takes 4 of the 200 mg tablet       Lactobacillus acidoph-pectin (ACIDOPHILUS-PECTIN) 75 million cell -100 mg cap capsule [LACTOBACILLUS ACIDOPH-PECTIN (ACIDOPHILUS-PECTIN) 75 MILLION CELL -100 MG CAP CAPSULE] Take 1 capsule by mouth at bedtime.      levothyroxine (SYNTHROID/LEVOTHROID) 112 MCG tablet Take 1 tablet (112 mcg) by mouth daily. 90 tablet 3    rosuvastatin (CRESTOR) 20 MG tablet Take 1 tablet (20 mg) by mouth daily. 34 tablet 3    semaglutide-weight management (WEGOVY) 0.25 MG/0.5ML pen Inject 0.5 mLs (0.25 mg) subcutaneously once a week. 2 mL 0    tirzepatide-Weight Management (ZEPBOUND) 2.5 MG/0.5ML prefilled pen Inject 0.5 mLs (2.5 mg) subcutaneously every 7 days. 2 mL 0    tirzepatide-weight management (ZEPBOUND) 2.5 MG/0.5ML vial Inject 0.5 mLs (2.5 mg) subcutaneously once a week. 2 mL 0    tirzepatide-weight management (ZEPBOUND) 5 MG/0.5ML vial Inject 0.5 mLs (5 mg) subcutaneously once a week. 3 mL 0    tirzepatide-weight management (ZEPBOUND) 7.5 MG/0.5ML vial Inject 0.5 mLs (7.5 mg) subcutaneously once a week. 3 mL 3    traZODone (DESYREL) 100 MG tablet Take 1 tablet (100 mg) by mouth at bedtime. 90 tablet 1    Vitamin D, Cholecalciferol, 25 MCG (1000 UT) CAPS Take 2,000 Units by mouth daily         Problem List:  Patient Active Problem List    Diagnosis Date Noted    Abnormal nuclear stress test 02/05/2025     Priority: Medium    Acquired hypothyroidism 12/17/2024     Priority: Medium    Calculus of ureter 06/22/2020     Priority: Medium     Added automatically from request for surgery [id number]        Calculus of kidney 06/22/2020     Priority: Medium     Added automatically from request for surgery [id number]       \    Past Medical/Surgical History:  Past Medical History:   Diagnosis Date    ADHD (attention deficit hyperactivity disorder)     Anxiety     Coronary artery disease     coronary calcification on chest CT    Hyperlipidemia     Hypothyroidism     Kidney stone     Obesity      Past Surgical History:   Procedure Laterality  Date    COMBINED CYSTOSCOPY, INSERT STENT URETER(S) Right 6/23/2020    Procedure: CYSTOSCOPY, WITH FLEXIBLE URETEROSCOPIC CALCULUS REMOVAL LASER LITHOTRIPSY  AND STENT INSERTION RIGHT;  Surgeon: Washington Moses MD;  Location: [facility];  Service: Urology    CV CORONARY ANGIOGRAM N/A 2/5/2025    Procedure: Coronary Angiogram;  Surgeon: Chava Nye MD;  Location: [facility] CATH LAB CV    CV LEFT HEART CATH N/A 2/5/2025    Procedure: Left Heart Catheterization;  Surgeon: Chava Nye MD;  Location: [facility] CATH LAB CV    OVARY SURGERY         Social History:  Social History     Socioeconomic History    Marital status:      Spouse name: Not on file    Number of children: Not on file    Years of education: Not on file    Highest education level: Not on file   Occupational History    Not on file   Tobacco Use    Smoking status: Former     Current packs/day: 0.00     Types: Cigarettes     Quit date: 2022     Years since quitting: 3.4    Smokeless tobacco: Never    Tobacco comments:     4-5 cigarettes per day     Quit 2022   Substance and Sexual Activity    Alcohol use: Not Currently    Drug use: No    Sexual activity: Not Currently   Other Topics Concern    Parent/sibling w/ CABG, MI or angioplasty before 65F 55M? No   Social History Narrative    Not on file     Social Drivers of Health     Financial Resource Strain: High Risk (8/1/2024)    Financial Resource Strain     Within the past 12 months, have you or your family members you live with been unable to get utilities (heat, electricity) when it was really needed?: Yes   Food Insecurity: Low Risk  (8/1/2024)    Food Insecurity     Within the past 12 months, did you worry that your food would run out before you got money to buy more?: No     Within the past 12 months, did the food you bought just not last and you didn t have money to get more?: No   Transportation Needs: Low Risk  (8/1/2024)    Transportation Needs     Within the past 12  "months, has lack of transportation kept you from medical appointments, getting your medicines, non-medical meetings or appointments, work, or from getting things that you need?: No   Physical Activity: Not on file   Stress: Not on file   Social Connections: Not on file   Interpersonal Safety: Low Risk  (2/5/2025)    Interpersonal Safety     Do you feel physically and emotionally safe where you currently live?: Yes     Within the past 12 months, have you been hit, slapped, kicked or otherwise physically hurt by someone?: No     Within the past 12 months, have you been humiliated or emotionally abused in other ways by your partner or ex-partner?: No   Housing Stability: Low Risk  (8/1/2024)    Housing Stability     Do you have housing? : Yes     Are you worried about losing your housing?: No       Family History:  Family History   Problem Relation Age of Onset    Alcoholism Mother     Cirrhosis Mother     Alcoholism Father        Review of Systems:  A complete review of systems reviewed by me is negative with the exeption of what has been mentioned in the history of present illness.  Her father passed away on May1st- grieving    Physical Examination:  Vitals: Ht 1.702 m (5' 7\")   Wt 101.6 kg (224 lb)   LMP  (LMP Unknown)   BMI 35.08 kg/m    BMI= Body mass index is 35.08 kg/m .  General: No apparent distress, appropriately groomed  Head: Normocephalic, atraumatic  Chest: No cough, no audible wheezing, able to talk in full sentences.   Psych: coherent speech, normal rate and volume, able to articulate logical thoughts, able   to abstract reason, no tangential thoughts, no hallucinations   or delusions   Her affect is normal   Neuro:  Mental status: Alert and  Oriented X 3  Speech: normal            Data: All pertinent previous laboratory data reviewed     Recent Labs   Lab Test 02/04/25  1352 12/10/24  1137    138   POTASSIUM 4.1 4.3   CHLORIDE 102 104   CO2 26 26   ANIONGAP 9 8   GLC 96 77   BUN 19.1 17.6   CR " "0.92 0.91   MASHA 10.0 9.6       Recent Labs   Lab Test 02/04/25  1352   WBC 6.6   RBC 5.12   HGB 14.4   HCT 44.4   MCV 87   MCH 28.1   MCHC 32.4   RDW 12.5          Recent Labs   Lab Test 12/06/24  0916   PROTTOTAL 6.9   ALBUMIN 4.4   BILITOTAL 0.2   ALKPHOS 154*   AST 17   ALT 29       TSH (uIU/mL)   Date Value   03/17/2025 3.87   12/06/2024 5.72 (H)   04/14/2021 2.59   07/08/2020 1.54       No results found for: \"UAMP\", \"UBARB\", \"BENZODIAZEUR\", \"UCANN\", \"UCOC\", \"OPIT\", \"UPCP\"    Ferritin   Date/Time Value Ref Range Status   08/01/2024 12:03 PM 77 11 - 328 ng/mL Final       No results found for: \"PH\", \"PHARTERIAL\", \"PO2\", \"NL5VSIFAWVN\", \"SAT\", \"PCO2\", \"HCO3\", \"BASEEXCESS\", \"JENIFER\", \"BEB\"       Echocardiography:   Study Date: 03/19/2025   Interpretation Summary:  Left ventricular size, wall motion and function are normal. The ejection  fraction is 55-60%.  No regional wall motion abnormalities noted.  Normal right ventricle size and systolic function.  The left atrium is mildly dilated.  No hemodynamically significant valvular abnormalities on 2D or color Doppler  images.    No previous study for comparison.    Chest x-ray: No results found for this or any previous visit from the past 365 days.      Chest CT: No results found for this or any previous visit from the past 365 days.      PFT: Most Recent [place] Pulmonary Function Testing: No results found         Laury Wei MD 6/4/2025           "

## 2025-06-05 NOTE — PATIENT INSTRUCTIONS
"Our clinic staff will contact you to schedule the home sleep study.  Please review the information provided below regarding the different treatment options for obstructive sleep apnea.  The results of the home sleep study will be communicated with you via Fifty100t in approximately 2 weeks and we will initiate treatment also via MeMeMehart if indicated with the plan to follow-up approximately 3 months after the sleep study.  Please follow regular sleep schedule every day of the week aim at obtaining 7 to 8 hours of sleep per night and avoid sleep deprivation  We recommend using a bright light box of 10,000 Lux intensity with exposure to bright light soon after you wake up in the morning for 30 to 60 minutes.  Please avoid napping during the day  We encourage you to follow healthy diet and exercise regularly.  Please avoid driving, operating any heavy machinery or other hazardous situations while drowsy or sleepy.                MY TREATMENT INFORMATION FOR SLEEP APNEA-  Kenia Dumont    DOCTOR : Laury Wei MD         Am I having a home sleep study?  --->Watch the video for the device you are using:    -/drop off device-   https://www.[employer].com/watch?v=yGGFBdELGhk       Frequently asked questions:  1. What is Obstructive Sleep Apnea (CATHY)? CATHY is the most common type of sleep apnea. Apnea means, \"without breath.\"  Apnea is most often caused by narrowing or collapse of the upper airway as muscles relax during sleep.   Almost everyone has occasional apneas. Most people with sleep apnea have had brief interruptions at night frequently for many years.  The severity of sleep apnea is related to how frequent and severe the events are.   2. What are the consequences of CATHY? Symptoms include: feeling sleepy during the day, snoring loudly, gasping or stopping of breathing, trouble sleeping, and occasionally morning headaches or heartburn at night.  Sleepiness can be serious and even " increase the risk of falling asleep while driving. Other health consequences may include development of high blood pressure and other cardiovascular disease in persons who are susceptible. Untreated CATHY  can contribute to heart disease, stroke and diabetes.   3. What are the treatment options? In most situations, sleep apnea is a lifelong disease that must be managed with daily therapy. Medications are not effective for sleep apnea and surgery is generally not considered until other therapies have been tried. Your treatment is your choice . Continuous Positive Airway (CPAP) works right away and is the therapy that is effective in nearly everyone. An oral device to hold your jaw forward is usually the next most reliable option. Other options include postioning devices (to keep you off your back), weight loss, and surgery including a tongue pacing device. There is more detail about some of these options below.  4. Are my sleep studies covered by insurance? Although we will request verification of coverage, we advise you also check in advance of the study to ensure there is coverage.    Important tips for those choosing CPAP and similar devices  REMEMBER-IF YOU RECEIVE A CALL FROM  [phone]-->IT IS TO SETUP A DEVICE  For new devices, sign up for device CHELO to monitor your device for your followup visits  We encourage you to utilize the [employer] chelo or website ( https://myair.[employer]/ ) to monitor your therapy progress and share the data with your healthcare team when you discuss your sleep apnea.                                                    Know your equipment:  CPAP is continuous positive airway pressure that prevents obstructive sleep apnea by keeping the throat from collapsing while you are sleeping. In most cases, the device is  smart  and can slowly self-adjusts if your throat collapses and keeps a record every day of how well you are treated-this information is available to you and your care  team.  BPAP is bilevel positive airway pressure that keeps your throat open and also assists each breath with a pressure boost to maintain adequate breathing.  Special kinds of BPAP are used in patients who have inadequate breathing from lung or heart disease. In most cases, the device is  smart  and can slowly self-adjusts to assist breathing. Like CPAP, the device keeps a record of how well you are treated.  Your mask is your connection to the device. You get to choose what feels most comfortable and the staff will help to make sure if fits. Here: are some examples of the different masks that are available: Magnetic mask aids may assist with use but there are safety issues that should be addressed when considering with magnets* ( see end of discussion).       Key points to remember on your journey with sleep apnea:  Sleep study.  PAP devices often need to be adjusted during a sleep study to show that they are effective and adjusted right.  Good tips to remember: Try wearing just the mask during a quiet time during the day so your body adapts to wearing it. A humidifier is recommended for comfort in most cases to prevent drying of your nose and throat. Allergy medication from your provider may help you if you are having nasal congestion.  Getting settled-in. It takes more than one night for most of us to get used to wearing a mask. Try wearing just the mask during a quiet time during the day so your body adapts to wearing it. A humidifier is recommended for comfort in most cases. Our team will work with you carefully on the first day and will be in contact within 4 days and again at 2 and 4 weeks for advice and remote device adjustments. Your therapy is evaluated by the device each day.   Use it every night. The more you are able to sleep naturally for 7-8 hours, the more likely you will have good sleep and to prevent health risks or symptoms from sleep apnea. Even if you use it 4 hours it helps. Occasionally all  of us are unable to use a medical therapy, in sleep apnea, it is not dangerous to miss one night.   Communicate. Call our skilled team on the number provided on the first day if your visit for problems that make it difficult to wear the device. Over 2 out of 3 patients can learn to wear the device long-term with help from our team. Remember to call our team or your sleep providers if you are unable to wear the device as we may have other solutions for those who cannot adapt to mask CPAP therapy. It is recommended that you sleep your sleep provider within the first 3 months and yearly after that if you are not having problems.   Use it for your health. We encourage use of CPAP masks during daytime quiet periods to allow your face and brain to adapt to the sensation of CPAP so that it will be a more natural sensation to awaken to at night or during naps. This can be very useful during the first few weeks or months of adapting to CPAP though it does not help medically to wear CPAP during wakefulness and  should not be used as a strategy just to meet guidelines.  Take care of your equipment. Make sure you clean your mask and tubing using directions every day and that your filter and mask are replaced as recommended or if they are not working.     *Masks with magnets:  Updated Contraindications  Masks with magnetic components are contraindicated for use by patients where they, or anyone in close physical contact while using the mask, have the following:   Active medical implants that interact with magnets (i.e., pacemakers, implantable cardioverter defibrillators (ICD), neurostimulators, cerebrospinal fluid (CSF) shunts, insulin/infusion pumps)   Metallic implants/objects containing ferromagnetic material (i.e., aneurysm clips/flow disruption devices, embolic coils, stents, valves, electrodes, implants to restore hearing or balance with implanted magnets, ocular implants, metallic splinters in the eye)  Updated  Warning  Keep the mask magnets at a safe distance of at least 6 inches (150 mm) away from implants or medical devices that may be adversely affected by magnetic interference. This warning applies to you or anyone in close physical contact with your mask. The magnets are in the frame and lower headgear clips, with a magnetic field strength of up to 400mT. When worn, they connect to secure the mask but may inadvertently detach while asleep.  Implants/medical devices, including those listed within contraindications, may be adversely affected if they change function under external magnetic fields or contain ferromagnetic materials that attract/repel to magnetic fields (some metallic implants, e.g., contact lenses with metal, dental implants, metallic cranial plates, screws, roselyn hole covers, and bone substitute devices). Consult your physician and  of your implant / other medical device for information on the potential adverse effects of magnetic fields.    BESIDES CPAP, WHAT OTHER THERAPIES ARE THERE?    Positioning Device  Positioning devices are generally used when sleep apnea is mild and only occurs on your back.This example shows a pillow that straps around the waist. It may be appropriate for those whose sleep study shows milder sleep apnea that occurs primarily when lying flat on one's back. Preliminary studies have shown benefit but effectiveness at home may need to be verified by a home sleep test. These devices are generally not covered by medical insurance.  Examples of devices that maintain sleeping on the back to prevent snoring and mild sleep apnea.    Belt type body positioner  http://Auto [employer]..[employer]/    Electronic reminder  http://nightshifttherapy.com/            Oral Appliance  What is oral appliance therapy?  An oral appliance device fits on your teeth at night like a retainer used after having braces. The device is made by a specialized dentist and requires several visits over 1-2 months  before a manufactured device is made to fit your teeth and is adjusted to prevent your sleep apnea. Once an oral device is working properly, snoring should be improved. A home sleep test may be recommended at that time if to determine whether the sleep apnea is adequately treated.       Some things to remember:  -Oral devices are often, but not always, covered by your medical insurance. Be sure to check with your insurance provider.   -If you are referred for oral therapy, you will be given a list of specialized dentists to consider or you may choose to visit the Web site of the American Academy of Dental Sleep Medicine  -Oral devices are less likely to work if you have severe sleep apnea or are extremely overweight.     More detailed information  An oral appliance is a small acrylic device that fits over the upper and lower teeth  (similar to a retainer or a mouth guard). This device slightly moves jaw forward, which moves the base of the tongue forward, opens the airway, improves breathing for effective treat snoring and obstructive sleep apnea in perhaps 7 out of 10 people .  The best working devices are custom-made by a dental device  after a mold is made of the teeth 1, 2, 3.  When is an oral appliance indicated?  Oral appliance therapy is recommended as a first-line treatment for patients with primary snoring, mild sleep apnea, and for patients with moderate sleep apnea who prefer appliance therapy to use of CPAP4, 5. Severity of sleep apnea is determined by sleep testing and is based on the number of respiratory events per hour of sleep.   How successful is oral appliance therapy?  The success rate of oral appliance therapy in patients with mild sleep apnea is 75-80% while in patients with moderate sleep apnea it is 50-70%. The chance of success in patients with severe sleep apnea is 40-50%. The research also shows that oral appliances have a beneficial effect on the cardiovascular health of CATHY  patients at the same magnitude as CPAP therapy7.  Oral appliances should be a second-line treatment in cases of severe sleep apnea, but if not completely successful then a combination therapy utilizing CPAP plus oral appliance therapy may be effective. Oral appliances tend to be effective in a broad range of patients although studies show that the patients who have the highest success are females, younger patients, those with milder disease, and less severe obesity. 3, 6.   Finding a dentist that practices dental sleep medicine  Specific training is available through the American Academy of Dental Sleep Medicine for dentists interested in working in the field of sleep. To find a dentist who is educated in the field of sleep and the use of oral appliances, near you, visit the Web site of the American Academy of Dental Sleep Medicine.    References  1. Etta et al. Objectively measured vs self-reported compliance during oral appliance therapy for sleep-disordered breathing. Chest 2013; 144(5): 6983-1984.  2. Parminder et al. Objective measurement of compliance during oral appliance therapy for sleep-disordered breathing. Thorax 2013; 68(1): 91-96.  3. Nimisha, et al. Mandibular advancement devices in 620 men and women with CATHY and snoring: tolerability and predictors of treatment success. Chest 2004; 125: 2546-3828.  4. Bipin, et al. Oral appliances for snoring and CATHY: a review. Sleep 2006; 29: 244-262.  5. Ramesh et al. Oral appliance treatment for CATHY: an update. J Clin Sleep Med 2014; 10(2): 215-227.  6. Jorge et al. Predictors of OSAH treatment outcome. J Dent Res 2007; 86: 7057-4652.      Weight Loss:   Your Body mass index is 35.08 kg/m .    Being overweight does not necessarily mean you will have health consequences.  Those who have BMI over 30 or over 27 with existing medical conditions carries greater risk.   Weight loss decreases severity of sleep apnea in most people with obesity. For  those with mild obesity who have developed snoring with weight gain, even 15-30 pound weight loss can improve and occasionally milder eliminate sleep apnea.  Structured and life-long dietary and health habits are necessary to lose weight and keep healthier weight levels.     The Comprehensive Weight loss program offers all aspects of weight loss strategies including two Non-Surgical Weight Loss Programs: Medical Weight Management and our 24 Week Healthy Lifestyle Program:  Medical Weight Management: You will meet with a Medical Weight Management Provider, as well as a Registered Dietician. The program may include medication therapy, dietary education, recommended exercise and physical therapy programs, monthly support group meetings, and possible psychological counseling. Follow up visits with the provider or dietician are scheduled based on your progress and needs.  24 Week Healthy Lifestyle Program: This unique program is designed to give you the support of weekly appointments and activities thru a 24-week period. It may include all of the components of the basic program (above), with the addition of 11 individual Health  Visits, 24-week access to the [employer] website for over 700 online classes, and monthly support group meetings. This program has an out-of-pocket expense of $499 to cover the items that can not be billed to insurance (health coaches and [employer] access), and is non-refundable/non-transferable (you may be able to use a Health Savings Account; ask your [facility] provider). There may be an optional meal replacement plan prescribed as well.   Medication therapy has been approved for the treatment of sleep apnea: The FDA approved tirzepatide (ZEPBOUND) for moderate to severe sleep apnea (apnea-hypopnea index greater than or equal to 15) in patients with BMI of greater than or equal to 30, or BMI greater than or equal to 27 with at least 1 weight-related condition such as hypertension or  dyslipidemia.  Surgical management achieves meaningful long-term weight loss and improvement in health risks in most patients with more severe obesity.      Sleep Apnea Surgery:    Surgery for obstructive sleep apnea is considered generally only when other therapies fail to work. Surgery may be discussed with you if you are having a difficult time tolerating CPAP and or when there is an abnormal structure that requires surgical correction.  Nose and throat surgeries often enlarge the airway to prevent collapse.  Most of these surgeries create pain for 1-2 weeks and up to half of the most common surgeries are not effective throughout life.  You should carefully discuss the benefits and drawbacks to surgery with your sleep provider and surgeon to determine if it is the best solution for you.   More information  Surgery for CATHY is directed at areas that are responsible for narrowing or complete obstruction of the airway during sleep.  There are a wide range of procedures available to enlarge and/or stabilize the airway to prevent blockage of breathing in the three major areas where it can occur: the palate, tongue, and nasal regions.  Successful surgical treatment depends on the accurate identification of the factors responsible for obstructive sleep apnea in each person.  A personalized approach is required because there is no single treatment that works well for everyone.  Because of anatomic variation, consultation with an examination by a sleep surgeon is a critical first step in determining what surgical options are best for each patient.  In some cases, examination during sedation may be recommended in order to guide the selection of procedures.  Patients will be counseled about risks and benefits as well as the typical recovery course after surgery. Surgery is typically not a cure for a person s CATHY.  However, surgery will often significantly improve one s CATHY severity (termed  success rate ).  Even in the  absence of a cure, surgery will decrease the cardiovascular risk associated with OSA7; improve overall quality of life8 (sleepiness, functionality, sleep quality, etc).      Palate Procedures:  Patients with CATHY often have narrowing of their airway in the region of their tonsils and uvula.  The goals of palate procedures are to widen the airway in this region as well as to help the tissues resist collapse.  Modern palate procedure techniques focus on tissue conservation and soft tissue rearrangement, rather than tissue removal.  Often the uvula is preserved in this procedure. Residual sleep apnea is common in patient after pharyngoplasty with an average reduction in sleep apnea events of 33%2.      Tongue Procedures:  ExamWhile patients are awake, the muscles that surround the throat are active and keep this region open for breathing. These muscles relax during sleep, allowing the tongue and other structures to collapse and block breathing.  There are several different tongue procedures available.  Selection of a tongue base procedure depends on characteristics seen on physical exam.  Generally, procedures are aimed at removing bulky tissues in this area or preventing the back of the tongue from falling back during sleep.  Success rates for tongue surgery range from 50-62%3.    Hypoglossal Nerve Stimulation:  Hypoglossal nerve stimulation has recently received approval from the [place] Food and Drug Administration for the treatment of obstructive sleep apnea.  This is based on research showing that the system was safe and effective in treating sleep apnea6.  Results showed that the median AHI score decreased 68%, from 29.3 to 9.0. This therapy uses an implant system that senses breathing patterns and delivers mild stimulation to airway muscles, which keeps the airway open during sleep.  The system consists of three fully implanted components: a small generator (similar in size to a pacemaker), a breathing  sensor, and a stimulation lead.  Using a small handheld remote, a patient turns the therapy on before bed and off upon awakening.    Candidates for this device must be greater than 18 years of age, have moderate to severe obstructive sleep apnea with less than 25% central events  (AHI between 15-65), BMI less than 35, have tried CPAP/oral appliance for at least 8 weeks without success, and have appropriate upper airway anatomy (determined by a sleep endoscopy performed by Dr. Arvin Motley or Dr. Nathaniel Bustamante).     Nasal Procedures:  Nasal obstruction can interfere with nasal breathing during the day and night.  Studies have shown that relief of nasal obstruction can improve the ability of some patients to tolerate positive airway pressure therapy for obstructive sleep apnea1.  Treatment options include medications such as nasal saline, topical corticosteroid and antihistamine sprays, and oral medications such as antihistamines or decongestants. Non-surgical treatments can include external nasal dilators for selected patients. If these are not successful by themselves, surgery can improve the nasal airway either alone or in combination with these other options.        Combination Procedures:  Combination of surgical procedures and other treatments may be recommended, particularly if patients have more than one area of narrowing or persistent positional disease.  The success rate of combination surgery ranges from 66-80%2,3.    References  Tish CHEN. The Role of the Nose in Snoring and Obstructive Sleep Apnoea: An Update.  Eur Arch Otorhinolaryngol. 2011; 268: 1365-73.   Eli SM; Bjorn JA; Glynn JR; Pallanch JF; Montrell MB; Aiden SG; Kesha MCLEAN. Surgical modifications of the upper airway for obstructive sleep apnea in adults: a systematic review and meta-analysis. SLEEP 2010;33(10):4533-7241. Gerald MENDEZ. Hypopharyngeal surgery in obstructive sleep apnea: an evidence-based medicine review.  Arch Otolaryngol Head  Neck Surg. 2006 Feb;132(2):206-13.  Pineda YH1, Sam Y, Refugio RODOLFO. The efficacy of anatomically based multilevel surgery for obstructive sleep apnea. Otolaryngol Head Neck Surg. 2003 Oct;129(4):327-35.  Kezirian E, Goldberg A. Hypopharyngeal Surgery in Obstructive Sleep Apnea: An Evidence-Based Medicine Review. Arch Otolaryngol Head Neck Surg. 2006 Feb;132(2):206-13.  Radha QUIÑONEZ et al. Upper-Airway Stimulation for Obstructive Sleep Apnea.  N Engl J Med. 2014 Jan 9;370(2):139-49.  Tawana Y et al. Increased Incidence of Cardiovascular Disease in Middle-aged Men with Obstructive Sleep Apnea. Am J Respir Crit Care Med; 2002 166: 159-165  Yanesvalentina XIE et al. Studying Life Effects and Effectiveness of Palatopharyngoplasty (SLEEP) study: Subjective Outcomes of Isolated Uvulopalatopharyngoplasty. Otolaryngol Head Neck Surg. 2011; 144: 623-631.        WHAT IF I ONLY HAVE SNORING?    Mandibular advancement devices, lateral sleep positioning, long-term weight loss and treatment of nasal allergies have been shown to improve snoring.  Exercising tongue muscles with a game (https://Watch-[employer]ites[employer]/us/chelo/Musicraiser-reduce-snoring/tt0166214646) or stimulating the tongue during the day with a device (https://doi.org/10.3390/ghg79499258) have improved snoring in some individuals.  https://www."[employer]/  https://www.sleepfoundation.org/best-anti-snoring-mouthpieces-and-mouthguards    Remember to Drive Safe... Drive Alive     Sleep health profoundly affects your health, mood, and your safety.  Thirty three percent of the population (one in three of us) is not getting enough sleep and many have a sleep disorder. Not getting enough sleep or having an untreated / undertreated sleep condition may make us sleepy without even knowing it. In fact, our driving could be dramatically impaired due to our sleep health. As your provider, here are some things I would like you to know about driving:     Here are some warning signs for impairment and  dangerous drowsy driving:              -Having been awake more than 16 hours               -Looking tired               -Eyelid drooping              -Head nodding (it could be too late at this point)              -Driving for more than 30 minutes     Some things you could do to make the driving safer if you are experiencing some drowsiness:              -Stop driving and rest              -Call for transportation              -Make sure your sleep disorder is adequately treated     Some things that have been shown NOT to work when experiencing drowsiness while driving:              -Turning on the radio              -Opening windows              -Eating any  distracting  /  entertaining  foods (e.g., sunflower seeds, candy, or any other)              -Talking on the phone      Your decision may not only impact your life, but also the life of others. Please, remember to drive safe for yourself and all of us.

## 2025-06-06 ENCOUNTER — LAB REQUISITION (OUTPATIENT)
Dept: LAB | Facility: CLINIC | Age: 60
End: 2025-06-06
Payer: COMMERCIAL

## 2025-06-06 DIAGNOSIS — Z12.4 ENCOUNTER FOR SCREENING FOR MALIGNANT NEOPLASM OF CERVIX: ICD-10-CM

## 2025-06-09 ENCOUNTER — PATIENT OUTREACH (OUTPATIENT)
Dept: CARE COORDINATION | Facility: CLINIC | Age: 60
End: 2025-06-09
Payer: COMMERCIAL

## 2025-06-15 DIAGNOSIS — F51.01 PRIMARY INSOMNIA: ICD-10-CM

## 2025-06-15 DIAGNOSIS — R45.84 ANHEDONIA: ICD-10-CM

## 2025-06-16 RX ORDER — TRAZODONE HYDROCHLORIDE 100 MG/1
100 TABLET ORAL AT BEDTIME
Qty: 90 TABLET | Refills: 3 | Status: SHIPPED | OUTPATIENT
Start: 2025-06-16

## 2025-06-16 RX ORDER — FLUOXETINE HYDROCHLORIDE 40 MG/1
40 CAPSULE ORAL DAILY
Qty: 90 CAPSULE | Refills: 3 | Status: SHIPPED | OUTPATIENT
Start: 2025-06-16

## 2025-06-17 ENCOUNTER — OFFICE VISIT (OUTPATIENT)
Dept: NEUROLOGY | Facility: CLINIC | Age: 60
End: 2025-06-17
Attending: INTERNAL MEDICINE
Payer: COMMERCIAL

## 2025-06-17 ENCOUNTER — RESULTS FOLLOW-UP (OUTPATIENT)
Dept: FAMILY MEDICINE | Facility: CLINIC | Age: 60
End: 2025-06-17

## 2025-06-17 DIAGNOSIS — M79.662 PAIN OF LEFT LOWER LEG: ICD-10-CM

## 2025-06-17 NOTE — LETTER
6/17/2025       RE: Kenia Dumont  [address]  [place] [address]     Dear Colleague,    Thank you for referring your patient, Kenia Dumont, to the [facility]. Please see a copy of my visit note below.    Please see procedure note.     Again, thank you for allowing me to participate in the care of your patient.      Sincerely,    Rasheeda Anderson MD

## 2025-06-17 NOTE — PROCEDURES
[facility]  Electrodiagnostic Laboratory                 Department of Neurology                                                                                                         Test Date:  2025    Patient: Yuni Dumont : 1965 Physician: Rasheeda Anderson MD   Sex: Female AGE: 59 year Ref Phys: Katelyn Saha MD   ID#: [id number]   Technician: Zoë Roe     History and Examination:  59 year old female presented with lower back pain and dull-aching pain in lateral hips. She also reports occasional pain in the legs, left slightly worse than right. This study is performed to assess for lumbosacral radiculopathy.     Techniques:  Motor and sensory conduction studies were done with surface recording electrodes. Temperature was monitored and recorded throughout the study. Upper extremities were maintained at a temperature of 32 degrees Centigrade or higher and Lower extremities were maintained at a temperature of 31 degrees Centigrade or higher.  EMG was done with a concentric needle electrode.     Results  Nerve conduction studies of bilateral lower limbs were normal.    Needle EMG of bilateral lower limbs and left lumbar paraspinal muscles were normal.  There were no fibrillation potentials.    Interpretation:  This is a normal study.  There is no electrophysiologic evidence of a lumbosacral radiculopathy or a large-fiber peripheral neuropathy in the current study.    Rasheeda Anderson MD  Department of Neurology        Nerve Conduction Studies  Motor Sites      Latency Neg. Amp Neg. Amp Diff Segment Distance Velocity Neg. Dur Neg Area Diff Temperature Comment   Site (ms) Norm (mV) Norm (%)  cm m/s Norm (ms) (%) ( C)    Left Fibular (EDB) Motor   Ankle 4.3  < 6.0 5.3 -  Ankle-EDB 8   5.4  31    Bel Fibular Head 11.1 - 4.7 - -11 Bel Fibular Head-Ankle 31.5 46  > 38 6.3 1 31.7    Pop Fossa 12.5 - 4.5 - -4 Pop Fossa-Bel Fibular Head 7.8 56  > 38 6.5 -4 31.8     Right Fibular (EDB) Motor   Ankle 4.5  < 6.0 5.3 -  Ankle-EDB 8   5.2  32.5    Left Tibial (AHB) Motor   Ankle 3.4  < 6.5 9.8  > 5.0  Ankle-AH 6.2   6.1  32.1    Knee 12.8 - 7.8 - -20 Knee-Ankle 40.2 43  > 38 7.0 -9 32.2    Right Tibial (AHB) Motor   Ankle 3.4  < 6.5 7.0  > 5.0  Ankle-AH 6.2   5.9  32.8      F-Wave Sites      Min F-Lat Max-Min F-Lat Mean F-Lat   Site (ms) (ms) (ms)   Left Fibular F-Wave   Ankle 37.6 15.0 -   Left Tibial F-Wave   Ankle 52.8 3.4 -     Sensory Sites      Onset Lat Peak Lat Amp (O-P) Amp (P-P) Segment Distance Velocity Temperature Comment   Site ms (ms)  V Norm ( V)  cm m/s Norm ( C)    Left Superficial Fibular Sensory   Lower Leg-Ankle 2.6 3.3 3  > 3 5 Lower Leg-Ankle 12.5 48 - 32.2    Right Superficial Fibular Sensory   Lower Leg-Ankle 2.7 3.1 3  > 3 3 Lower Leg-Ankle 12.5 46 - 32.3    Left Sural Sensory   Calf-Lat Malleolus 2.2 2.8 12  > 5 28 Calf-Lat Malleolus 12 55  > 38 32.2    Right Sural Sensory   Calf-Lat Malleolus 2.7 3.5 12  > 5 13 Calf-Lat Malleolus 14 52  > 38 32.1        Electromyography     Side Muscle Ins Act Fibs/PSW Fasc HF Amp Dur Poly Recrt Int Pat   Left Tib ant Nml None Nml 0 Nml Nml 0 Nml Nml   Left Gastroc MH Nml None Nml 0 Nml Nml 0 Nml Nml   Left Vastus med Nml None Nml 0 Nml Nml 0 Nml Nml   Left Add longus Nml None Nml 0 Nml Nml 0 Nml Nml   Left Gluteus med Nml None Nml 0 Nml Nml 0 Nml Nml   Left Lumbar PSP Nml None Nml 0 Nml Nml 0 Nml Nml   Right Tib ant Nml None Nml 0 Nml Nml 0 Nml Nml   Right Gastroc MH Nml None Nml 0 Nml Nml 0 Nml Nml   Right Vastus med Nml None Nml 0 Nml Nml 0 Nml Nml   Right Tens fasc lat Nml None Nml 0 Nml Nml 0 Nml Nml         Waveforms / Images:    Motor                Sensory                F-Wave

## 2025-07-16 PROCEDURE — 88304 TISSUE EXAM BY PATHOLOGIST: CPT | Mod: TC,ORL | Performed by: SURGERY

## 2025-07-16 PROCEDURE — 88304 TISSUE EXAM BY PATHOLOGIST: CPT | Mod: 26 | Performed by: PATHOLOGY

## 2025-07-17 ENCOUNTER — LAB REQUISITION (OUTPATIENT)
Dept: LAB | Facility: CLINIC | Age: 60
End: 2025-07-17
Payer: COMMERCIAL

## 2025-07-17 DIAGNOSIS — K62.89 OTHER SPECIFIED DISEASES OF ANUS AND RECTUM: ICD-10-CM

## 2025-07-21 NOTE — PROGRESS NOTES
HEART CARE ENCOUNTER NOTE      [facility]  [phone]      Assessment/Recommendations   Assessment:   Coronary artery disease: Mild nonobstructive disease in proximal LAD and throughout RCA seen on coronary angiogram 2/5/2025.  Patient denies angina.  Hypercholesterolemia: On rosuvastatin 20 mg daily.  Last lipids 1/25/2025 showed LDL of 100.  This was prior to switching from atorvastatin to rosuvastatin.  Will recheck LDL today.  Dyspnea on exertion: Suspect this may be multifactorial related to menopause, patient's weight gain, and possibly her hypothyroidism.  Coronary angiogram 2/5/2025 without obstructive disease.  Echocardiogram 3/19/2025 showing normal EF 55 to 60% and no regional wall motion abnormalities.  No known palpitations.  Reassuring cardiac workup-recommend following up as scheduled with endocrinology and PCP.      Plan:   LDL cholesterol check today  Continue current medications  Recommend following up with endocrinology as scheduled and PCP to further discuss symptoms of dyspnea and fatigue  Encouraged increasing activity      Follow up in 1 year with Dr. Ceja     History of Present Illness/Subjective    HPI: Kenia Dumont is a 59 year old female with PMHx of hypercholesterolemia, hypothyroidism, ADHD, mild nonobstructive CAD presents for follow-up.  Patient saw Dr. Ceja 1/22/2025 after his CT had shown significant calcification in coronary arteries.  Patient did note a decline in exercise capacity after the last 6 months as well as worsening dyspnea on exertion along with a 40 pound weight gain.  Patient underwent nuclear stress test 1/30/2025 that showed a small area of mild to moderate ischemia.  This led to coronary angiogram 2/5/2025 showing mild nonobstructive disease in proximal LAD as well as proximal, mid, and distal RCA.  Patient's atorvastatin was switched to rosuvastatin given LDL of 100.    Patient continues to note shortness of breath on exertion,  fatigue, and weight gain.  Also endorses discomfort in her legs.  Had trialed coming off of atorvastatin for 1 month previously when symptoms first developed roughly 3 years ago and denies any change in her symptoms.  Patient notes she was diagnosed with hypothyroidism 3 years ago and has been on thyroid medications and that her levels are occasionally off.  Notes she used to walk 5 miles a day but with her legs getting more painful she stopped walking about a year ago.  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  Denies palpitations, lower extremity edema, lightheadedness.        Echocardiogram 3/19/2025 results:  Left ventricular size, wall motion and function are normal. The ejection  fraction is 55-60%.  No regional wall motion abnormalities noted.  Normal right ventricle size and systolic function.  The left atrium is mildly dilated.  No hemodynamically significant valvular abnormalities on 2D or color Doppler  images.     No previous study for comparison.      Coronary angiogram 2/5/2025    Prox RCA lesion is 20% stenosed.    Mid RCA lesion is 40% stenosed.    Dist RCA lesion is 30% stenosed.    Prox LAD to Mid LAD lesion is 20% stenosed.     1. mild nonobstructive coronary disease in proximal LAD and proximal mid and distal RCA.  No significant stenoses.  2.  LVEDP = 18 mmHg    Nuclear treadmill stress test 1/30/2025    Exercise stress ECG negative for ischemia.    The nuclear stress test is abnormal.  There is a small area of mild to moderate ischemia involving the mid to distal anterior wall along with a small area of nontransmural infarction near the apex.  Specificity of this finding may be reduced by breast attenuation noted.    The left ventricular ejection fraction at stress is greater than 70%.    The patient is at a low to intermediate risk of future cardiac ischemic events.    There is no prior study for comparison.     Physical Examination  Review of Systems   Vitals:  "/76 (BP Location: Right arm, Patient Position: Sitting, Cuff Size: Adult Regular)   Pulse 76   Resp 16   Ht 1.689 m (5' 6.5\")   Wt 98.7 kg (217 lb 9.6 oz)   LMP  (LMP Unknown)   BMI 34.60 kg/m    BMI= Body mass index is 34.6 kg/m .  Wt Readings from Last 3 Encounters:   07/22/25 98.7 kg (217 lb 9.6 oz)   06/04/25 101.6 kg (224 lb)   03/31/25 104.8 kg (231 lb)           ENT/Mouth: membranes moist, no oral lesions or bleeding gums.      EYES:  no scleral icterus, normal conjunctivae       Chest/Lungs:   lungs are clear to auscultation, no rales or wheezing,  equal chest wall expansion    Cardiovascular:   Regular. Normal first and second heart sounds with no murmurs, rubs, or gallops; the carotid, radial and posterior tibial pulses are intact, no edema bilaterally        Extremities: no cyanosis or clubbing   Skin: no xanthelasma, warm.    Neurologic: no tremors     Psychiatric: alert and oriented x3, calm        Please refer above for cardiac ROS details.        Medical History  Surgical History Family History Social History   Past Medical History:   Diagnosis Date    ADHD (attention deficit hyperactivity disorder)     Anxiety     Coronary artery disease     coronary calcification on chest CT    Hyperlipidemia     Hypothyroidism     Kidney stone     Obesity      Past Surgical History:   Procedure Laterality Date    COMBINED CYSTOSCOPY, INSERT STENT URETER(S) Right 6/23/2020    Procedure: CYSTOSCOPY, WITH FLEXIBLE URETEROSCOPIC CALCULUS REMOVAL LASER LITHOTRIPSY  AND STENT INSERTION RIGHT;  Surgeon: Washington Moses MD;  Location: [facility];  Service: Urology    CV CORONARY ANGIOGRAM N/A 2/5/2025    Procedure: Coronary Angiogram;  Surgeon: Chava Nye MD;  Location: [facility] CATH LAB CV    CV LEFT HEART CATH N/A 2/5/2025    Procedure: Left Heart Catheterization;  Surgeon: Chava Nye MD;  Location: [facility] LAB CV    OVARY SURGERY       Family History   Problem Relation Age of " Onset    Alcoholism Mother     Cirrhosis Mother     Alcoholism Father         Social History     Socioeconomic History    Marital status:      Spouse name: Not on file    Number of children: Not on file    Years of education: Not on file    Highest education level: Not on file   Occupational History    Not on file   Tobacco Use    Smoking status: Former     Current packs/day: 0.00     Types: Cigarettes     Quit date: 2022     Years since quitting: 3.5    Smokeless tobacco: Never    Tobacco comments:     4-5 cigarettes per day     Quit 2022   Substance and Sexual Activity    Alcohol use: Not Currently    Drug use: No    Sexual activity: Not Currently   Other Topics Concern    Parent/sibling w/ CABG, MI or angioplasty before 65F 55M? No   Social History Narrative    Not on file     Social Drivers of Health     Financial Resource Strain: High Risk (8/1/2024)    Financial Resource Strain     Within the past 12 months, have you or your family members you live with been unable to get utilities (heat, electricity) when it was really needed?: Yes   Food Insecurity: Low Risk  (8/1/2024)    Food Insecurity     Within the past 12 months, did you worry that your food would run out before you got money to buy more?: No     Within the past 12 months, did the food you bought just not last and you didn t have money to get more?: No   Transportation Needs: Low Risk  (8/1/2024)    Transportation Needs     Within the past 12 months, has lack of transportation kept you from medical appointments, getting your medicines, non-medical meetings or appointments, work, or from getting things that you need?: No   Physical Activity: Not on file   Stress: Not on file   Social Connections: Not on file   Interpersonal Safety: Low Risk  (2/5/2025)    Interpersonal Safety     Do you feel physically and emotionally safe where you currently live?: Yes     Within the past 12 months, have you been hit, slapped, kicked or otherwise physically  hurt by someone?: No     Within the past 12 months, have you been humiliated or emotionally abused in other ways by your partner or ex-partner?: No   Housing Stability: Low Risk  (8/1/2024)    Housing Stability     Do you have housing? : Yes     Are you worried about losing your housing?: No           Medications  Allergies   Current Outpatient Medications   Medication Sig Dispense Refill    clobetasol propionate (TEMOVATE) 0.05 % external cream Apply to AA BID x 1-2 weeks then PRN. Do not apply to face. 60 g 3    FLUoxetine (PROZAC) 40 MG capsule Take 1 capsule (40 mg) by mouth daily. 90 capsule 3    gabapentin (NEURONTIN) 300 MG capsule Start 300mg at night for 3 days then increase to twice daily. 60 capsule 11    ibuprofen (ADVIL/MOTRIN) 600 MG tablet Take 800 mg by mouth as needed for moderate pain. Takes it rarely. She takes 4 of the 200 mg tablet      Lactobacillus acidoph-pectin (ACIDOPHILUS-PECTIN) 75 million cell -100 mg cap capsule [LACTOBACILLUS ACIDOPH-PECTIN (ACIDOPHILUS-PECTIN) 75 MILLION CELL -100 MG CAP CAPSULE] Take 1 capsule by mouth at bedtime.      levothyroxine (SYNTHROID/LEVOTHROID) 112 MCG tablet Take 1 tablet (112 mcg) by mouth daily. 90 tablet 3    rosuvastatin (CRESTOR) 20 MG tablet Take 1 tablet (20 mg) by mouth daily. 34 tablet 3    semaglutide-weight management (WEGOVY) 0.25 MG/0.5ML pen Inject 0.5 mLs (0.25 mg) subcutaneously once a week. 2 mL 0    tirzepatide-Weight Management (ZEPBOUND) 2.5 MG/0.5ML prefilled pen Inject 0.5 mLs (2.5 mg) subcutaneously every 7 days. 2 mL 0    tirzepatide-weight management (ZEPBOUND) 2.5 MG/0.5ML vial Inject 0.5 mLs (2.5 mg) subcutaneously once a week. 2 mL 0    tirzepatide-weight management (ZEPBOUND) 5 MG/0.5ML vial Inject 0.5 mLs (5 mg) subcutaneously once a week. 3 mL 0    tirzepatide-weight management (ZEPBOUND) 7.5 MG/0.5ML vial Inject 0.5 mLs (7.5 mg) subcutaneously once a week. 3 mL 3    traZODone (DESYREL) 100 MG tablet Take 1 tablet (100 mg)  "by mouth at bedtime. 90 tablet 3    Vitamin D, Cholecalciferol, 25 MCG (1000 UT) CAPS Take 2,000 Units by mouth daily         Allergies   Allergen Reactions    Tetracyclines & Related Unknown    Zyban [Bupropion] Unknown          Lab Results    Chemistry/lipid CBC Cardiac Enzymes/BNP/TSH/INR   Recent Labs   Lab Test 01/22/25  0914   CHOL 166   HDL 56   *   TRIG 50     Recent Labs   Lab Test 01/22/25  0914 02/23/24  1433 12/19/22  1145   * 89 160*     Recent Labs   Lab Test 02/04/25  1352      POTASSIUM 4.1   CHLORIDE 102   CO2 26   GLC 96   BUN 19.1   CR 0.92   GFRESTIMATED 72   MASHA 10.0     Recent Labs   Lab Test 02/04/25  1352 12/10/24  1137 12/06/24  0916   CR 0.92 0.91 1.01*     Recent Labs   Lab Test 03/17/25  1023 02/03/23  0959   A1C 5.8* 5.8*          Recent Labs   Lab Test 02/04/25  1352   WBC 6.6   HGB 14.4   HCT 44.4   MCV 87        Recent Labs   Lab Test 02/04/25  1352 12/10/24  1137 08/01/24  1203   HGB 14.4 12.8 14.1    No results for input(s): \"TROPONINI\" in the last 82919 hours.  Recent Labs   Lab Test 12/06/24  0916   NTBNP 72     Recent Labs   Lab Test 03/17/25  1023   TSH 3.87     No results for input(s): \"INR\" in the last 73426 hours.       Rosa Isela Craig PA-C                                       "

## 2025-07-22 ENCOUNTER — OFFICE VISIT (OUTPATIENT)
Dept: CARDIOLOGY | Facility: CLINIC | Age: 60
End: 2025-07-22
Payer: COMMERCIAL

## 2025-07-22 VITALS
BODY MASS INDEX: 34.15 KG/M2 | HEIGHT: 67 IN | WEIGHT: 217.6 LBS | RESPIRATION RATE: 16 BRPM | DIASTOLIC BLOOD PRESSURE: 76 MMHG | HEART RATE: 76 BPM | SYSTOLIC BLOOD PRESSURE: 104 MMHG

## 2025-07-22 DIAGNOSIS — I25.10 CORONARY ARTERY DISEASE DUE TO LIPID RICH PLAQUE: ICD-10-CM

## 2025-07-22 DIAGNOSIS — E78.2 MIXED HYPERLIPIDEMIA: ICD-10-CM

## 2025-07-22 DIAGNOSIS — E66.01 MORBID OBESITY (H): ICD-10-CM

## 2025-07-22 DIAGNOSIS — I25.83 CORONARY ARTERY DISEASE DUE TO LIPID RICH PLAQUE: ICD-10-CM

## 2025-07-22 DIAGNOSIS — R06.02 SOB (SHORTNESS OF BREATH) ON EXERTION: ICD-10-CM

## 2025-07-22 LAB — LDLC SERPL DIRECT ASSAY-MCNC: 83 MG/DL

## 2025-07-22 PROCEDURE — 36415 COLL VENOUS BLD VENIPUNCTURE: CPT | Performed by: STUDENT IN AN ORGANIZED HEALTH CARE EDUCATION/TRAINING PROGRAM

## 2025-07-22 PROCEDURE — 3078F DIAST BP <80 MM HG: CPT | Performed by: STUDENT IN AN ORGANIZED HEALTH CARE EDUCATION/TRAINING PROGRAM

## 2025-07-22 PROCEDURE — 3074F SYST BP LT 130 MM HG: CPT | Performed by: STUDENT IN AN ORGANIZED HEALTH CARE EDUCATION/TRAINING PROGRAM

## 2025-07-22 PROCEDURE — 83721 ASSAY OF BLOOD LIPOPROTEIN: CPT | Performed by: STUDENT IN AN ORGANIZED HEALTH CARE EDUCATION/TRAINING PROGRAM

## 2025-07-22 PROCEDURE — G2211 COMPLEX E/M VISIT ADD ON: HCPCS | Performed by: STUDENT IN AN ORGANIZED HEALTH CARE EDUCATION/TRAINING PROGRAM

## 2025-07-22 PROCEDURE — 99214 OFFICE O/P EST MOD 30 MIN: CPT | Performed by: STUDENT IN AN ORGANIZED HEALTH CARE EDUCATION/TRAINING PROGRAM

## 2025-07-22 RX ORDER — DULOXETIN HYDROCHLORIDE 60 MG/1
40 CAPSULE, DELAYED RELEASE ORAL DAILY
COMMUNITY

## 2025-07-22 NOTE — PATIENT INSTRUCTIONS
Kenia Dumont,    It was a pleasure to see you today at the [facility].     My recommendations after this visit include:    - Continue current medications.   - LDL cholesterol check today  - Follow up with Dr. Ceja in 1 year, sooner if needed    - Please call [phone], if you have any questions or concerns      Rosa Isela Craig PA-C

## 2025-07-22 NOTE — LETTER
7/22/2025    Katelyn Saha MD  [address] [facility] [address]    RE: Kenia Dumont       Dear Colleague,     I had the pleasure of seeing Kenia Dumont in the [facility].    HEART CARE ENCOUNTER NOTE      [facility]  [phone]      Assessment/Recommendations   Assessment:   Coronary artery disease: Mild nonobstructive disease in proximal LAD and throughout RCA seen on coronary angiogram 2/5/2025.  Patient denies angina.  Hypercholesterolemia: On rosuvastatin 20 mg daily.  Last lipids 1/25/2025 showed LDL of 100.  This was prior to switching from atorvastatin to rosuvastatin.  Will recheck LDL today.  Dyspnea on exertion: Suspect this may be multifactorial related to menopause, patient's weight gain, and possibly her hypothyroidism.  Coronary angiogram 2/5/2025 without obstructive disease.  Echocardiogram 3/19/2025 showing normal EF 55 to 60% and no regional wall motion abnormalities.  No known palpitations.  Reassuring cardiac workup-recommend following up as scheduled with endocrinology and PCP.      Plan:   LDL cholesterol check today  Continue current medications  Recommend following up with endocrinology as scheduled and PCP to further discuss symptoms of dyspnea and fatigue  Encouraged increasing activity      Follow up in 1 year with Dr. Ceja     History of Present Illness/Subjective    HPI: Kenia Dumont is a 59 year old female with PMHx of hypercholesterolemia, hypothyroidism, ADHD, mild nonobstructive CAD presents for follow-up.  Patient saw Dr. Ceja 1/22/2025 after his CT had shown significant calcification in coronary arteries.  Patient did note a decline in exercise capacity after the last 6 months as well as worsening dyspnea on exertion along with a 40 pound weight gain.  Patient underwent nuclear stress test 1/30/2025 that showed a small area of mild to moderate ischemia.  This led to coronary angiogram 2/5/2025 showing mild  nonobstructive disease in proximal LAD as well as proximal, mid, and distal RCA.  Patient's atorvastatin was switched to rosuvastatin given LDL of 100.    Patient continues to note shortness of breath on exertion, fatigue, and weight gain.  Also endorses discomfort in her legs.  Had trialed coming off of atorvastatin for 1 month previously when symptoms first developed roughly 3 years ago and denies any change in her symptoms.  Patient notes she was diagnosed with hypothyroidism 3 years ago and has been on thyroid medications and that her levels are occasionally off.  Notes she used to walk 5 miles a day but with her legs getting more painful she stopped walking about a year ago.  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  Denies palpitations, lower extremity edema, lightheadedness.        Echocardiogram 3/19/2025 results:  Left ventricular size, wall motion and function are normal. The ejection  fraction is 55-60%.  No regional wall motion abnormalities noted.  Normal right ventricle size and systolic function.  The left atrium is mildly dilated.  No hemodynamically significant valvular abnormalities on 2D or color Doppler  images.     No previous study for comparison.      Coronary angiogram 2/5/2025     Prox RCA lesion is 20% stenosed.     Mid RCA lesion is 40% stenosed.     Dist RCA lesion is 30% stenosed.     Prox LAD to Mid LAD lesion is 20% stenosed.     1. mild nonobstructive coronary disease in proximal LAD and proximal mid and distal RCA.  No significant stenoses.  2.  LVEDP = 18 mmHg    Nuclear treadmill stress test 1/30/2025     Exercise stress ECG negative for ischemia.     The nuclear stress test is abnormal.  There is a small area of mild to moderate ischemia involving the mid to distal anterior wall along with a small area of nontransmural infarction near the apex.  Specificity of this finding may be reduced by breast attenuation noted.     The left ventricular  "ejection fraction at stress is greater than 70%.     The patient is at a low to intermediate risk of future cardiac ischemic events.     There is no prior study for comparison.     Physical Examination  Review of Systems   Vitals: /76 (BP Location: Right arm, Patient Position: Sitting, Cuff Size: Adult Regular)   Pulse 76   Resp 16   Ht 1.689 m (5' 6.5\")   Wt 98.7 kg (217 lb 9.6 oz)   LMP  (LMP Unknown)   BMI 34.60 kg/m    BMI= Body mass index is 34.6 kg/m .  Wt Readings from Last 3 Encounters:   07/22/25 98.7 kg (217 lb 9.6 oz)   06/04/25 101.6 kg (224 lb)   03/31/25 104.8 kg (231 lb)           ENT/Mouth: membranes moist, no oral lesions or bleeding gums.      EYES:  no scleral icterus, normal conjunctivae       Chest/Lungs:   lungs are clear to auscultation, no rales or wheezing,  equal chest wall expansion    Cardiovascular:   Regular. Normal first and second heart sounds with no murmurs, rubs, or gallops; the carotid, radial and posterior tibial pulses are intact, no edema bilaterally        Extremities: no cyanosis or clubbing   Skin: no xanthelasma, warm.    Neurologic: no tremors     Psychiatric: alert and oriented x3, calm        Please refer above for cardiac ROS details.        Medical History  Surgical History Family History Social History   Past Medical History:   Diagnosis Date     ADHD (attention deficit hyperactivity disorder)      Anxiety      Coronary artery disease     coronary calcification on chest CT     Hyperlipidemia      Hypothyroidism      Kidney stone      Obesity      Past Surgical History:   Procedure Laterality Date     COMBINED CYSTOSCOPY, INSERT STENT URETER(S) Right 6/23/2020    Procedure: CYSTOSCOPY, WITH FLEXIBLE URETEROSCOPIC CALCULUS REMOVAL LASER LITHOTRIPSY  AND STENT INSERTION RIGHT;  Surgeon: Washington Moses MD;  Location: [facility];  Service: Urology     CV CORONARY ANGIOGRAM N/A 2/5/2025    Procedure: Coronary Angiogram;  Surgeon: Chava Nye, " MD;  Location: [facility] CV     CV LEFT HEART CATH N/A 2/5/2025    Procedure: Left Heart Catheterization;  Surgeon: Chava Nye MD;  Location: [facility] CV     OVARY SURGERY       Family History   Problem Relation Age of Onset     Alcoholism Mother      Cirrhosis Mother      Alcoholism Father         Social History     Socioeconomic History     Marital status:      Spouse name: Not on file     Number of children: Not on file     Years of education: Not on file     Highest education level: Not on file   Occupational History     Not on file   Tobacco Use     Smoking status: Former     Current packs/day: 0.00     Types: Cigarettes     Quit date: 2022     Years since quitting: 3.5     Smokeless tobacco: Never     Tobacco comments:     4-5 cigarettes per day     Quit 2022   Substance and Sexual Activity     Alcohol use: Not Currently     Drug use: No     Sexual activity: Not Currently   Other Topics Concern     Parent/sibling w/ CABG, MI or angioplasty before 65F 55M? No   Social History Narrative     Not on file     Social Drivers of Health     Financial Resource Strain: High Risk (8/1/2024)    Financial Resource Strain      Within the past 12 months, have you or your family members you live with been unable to get utilities (heat, electricity) when it was really needed?: Yes   Food Insecurity: Low Risk  (8/1/2024)    Food Insecurity      Within the past 12 months, did you worry that your food would run out before you got money to buy more?: No      Within the past 12 months, did the food you bought just not last and you didn t have money to get more?: No   Transportation Needs: Low Risk  (8/1/2024)    Transportation Needs      Within the past 12 months, has lack of transportation kept you from medical appointments, getting your medicines, non-medical meetings or appointments, work, or from getting things that you need?: No   Physical Activity: Not on file   Stress: Not on file   Social  Connections: Not on file   Interpersonal Safety: Low Risk  (2/5/2025)    Interpersonal Safety      Do you feel physically and emotionally safe where you currently live?: Yes      Within the past 12 months, have you been hit, slapped, kicked or otherwise physically hurt by someone?: No      Within the past 12 months, have you been humiliated or emotionally abused in other ways by your partner or ex-partner?: No   Housing Stability: Low Risk  (8/1/2024)    Housing Stability      Do you have housing? : Yes      Are you worried about losing your housing?: No           Medications  Allergies   Current Outpatient Medications   Medication Sig Dispense Refill     clobetasol propionate (TEMOVATE) 0.05 % external cream Apply to AA BID x 1-2 weeks then PRN. Do not apply to face. 60 g 3     FLUoxetine (PROZAC) 40 MG capsule Take 1 capsule (40 mg) by mouth daily. 90 capsule 3     gabapentin (NEURONTIN) 300 MG capsule Start 300mg at night for 3 days then increase to twice daily. 60 capsule 11     ibuprofen (ADVIL/MOTRIN) 600 MG tablet Take 800 mg by mouth as needed for moderate pain. Takes it rarely. She takes 4 of the 200 mg tablet       Lactobacillus acidoph-pectin (ACIDOPHILUS-PECTIN) 75 million cell -100 mg cap capsule [LACTOBACILLUS ACIDOPH-PECTIN (ACIDOPHILUS-PECTIN) 75 MILLION CELL -100 MG CAP CAPSULE] Take 1 capsule by mouth at bedtime.       levothyroxine (SYNTHROID/LEVOTHROID) 112 MCG tablet Take 1 tablet (112 mcg) by mouth daily. 90 tablet 3     rosuvastatin (CRESTOR) 20 MG tablet Take 1 tablet (20 mg) by mouth daily. 34 tablet 3     semaglutide-weight management (WEGOVY) 0.25 MG/0.5ML pen Inject 0.5 mLs (0.25 mg) subcutaneously once a week. 2 mL 0     tirzepatide-Weight Management (ZEPBOUND) 2.5 MG/0.5ML prefilled pen Inject 0.5 mLs (2.5 mg) subcutaneously every 7 days. 2 mL 0     tirzepatide-weight management (ZEPBOUND) 2.5 MG/0.5ML vial Inject 0.5 mLs (2.5 mg) subcutaneously once a week. 2 mL 0      "tirzepatide-weight management (ZEPBOUND) 5 MG/0.5ML vial Inject 0.5 mLs (5 mg) subcutaneously once a week. 3 mL 0     tirzepatide-weight management (ZEPBOUND) 7.5 MG/0.5ML vial Inject 0.5 mLs (7.5 mg) subcutaneously once a week. 3 mL 3     traZODone (DESYREL) 100 MG tablet Take 1 tablet (100 mg) by mouth at bedtime. 90 tablet 3     Vitamin D, Cholecalciferol, 25 MCG (1000 UT) CAPS Take 2,000 Units by mouth daily         Allergies   Allergen Reactions     Tetracyclines & Related Unknown     Zyban [Bupropion] Unknown          Lab Results    Chemistry/lipid CBC Cardiac Enzymes/BNP/TSH/INR   Recent Labs   Lab Test 01/22/25  0914   CHOL 166   HDL 56   *   TRIG 50     Recent Labs   Lab Test 01/22/25  0914 02/23/24  1433 12/19/22  1145   * 89 160*     Recent Labs   Lab Test 02/04/25  1352      POTASSIUM 4.1   CHLORIDE 102   CO2 26   GLC 96   BUN 19.1   CR 0.92   GFRESTIMATED 72   MASHA 10.0     Recent Labs   Lab Test 02/04/25  1352 12/10/24  1137 12/06/24  0916   CR 0.92 0.91 1.01*     Recent Labs   Lab Test 03/17/25  1023 02/03/23  0959   A1C 5.8* 5.8*          Recent Labs   Lab Test 02/04/25  1352   WBC 6.6   HGB 14.4   HCT 44.4   MCV 87        Recent Labs   Lab Test 02/04/25  1352 12/10/24  1137 08/01/24  1203   HGB 14.4 12.8 14.1    No results for input(s): \"TROPONINI\" in the last 23925 hours.  Recent Labs   Lab Test 12/06/24  0916   NTBNP 72     Recent Labs   Lab Test 03/17/25  1023   TSH 3.87     No results for input(s): \"INR\" in the last 66290 hours.       Rosa Isela Craig PA-C                                         Thank you for allowing me to participate in the care of your patient.      Sincerely,     Rosa Isela Craig PA-C     [facility]  cc:   Yuni Ceja MD  [address], [address]  [place] [address]      "

## 2025-08-27 ENCOUNTER — OFFICE VISIT (OUTPATIENT)
Dept: INTERNAL MEDICINE | Facility: CLINIC | Age: 60
End: 2025-08-27
Payer: COMMERCIAL

## 2025-08-27 PROBLEM — F33.1 MODERATE RECURRENT MAJOR DEPRESSION (H): Status: ACTIVE | Noted: 2025-08-27

## 2025-08-28 ENCOUNTER — PATIENT OUTREACH (OUTPATIENT)
Dept: CARE COORDINATION | Facility: CLINIC | Age: 60
End: 2025-08-28
Payer: COMMERCIAL

## 2025-09-01 ENCOUNTER — PATIENT OUTREACH (OUTPATIENT)
Dept: CARE COORDINATION | Facility: CLINIC | Age: 60
End: 2025-09-01
Payer: COMMERCIAL

## (undated) DEVICE — CATH DIAGNOSTIC RADIAL 5FR TIG 4.0

## (undated) DEVICE — KIT HAND CONTROL ACIST 014644 AR-P54

## (undated) DEVICE — ELECTRODE DEFIB CADENCE 22550R

## (undated) DEVICE — SLEEVE TR BAND RADIAL COMPRESSION DEVICE 24CM TRB24-REG

## (undated) DEVICE — SYR ANGIOGRAPHY MULTIUSE KIT ACIST 014612

## (undated) DEVICE — SHTH INTRO 0.021IN ID 6FR DIA

## (undated) DEVICE — MANIFOLD KIT ANGIO AUTOMATED 014613

## (undated) DEVICE — CUSTOM PACK CORONARY SAN5BCRHEA

## (undated) RX ORDER — FENTANYL CITRATE 50 UG/ML
INJECTION, SOLUTION INTRAMUSCULAR; INTRAVENOUS
Status: DISPENSED
Start: 2025-02-05